# Patient Record
Sex: FEMALE | Race: WHITE | NOT HISPANIC OR LATINO | Employment: OTHER | URBAN - METROPOLITAN AREA
[De-identification: names, ages, dates, MRNs, and addresses within clinical notes are randomized per-mention and may not be internally consistent; named-entity substitution may affect disease eponyms.]

---

## 2020-01-27 ENCOUNTER — OFFICE VISIT (OUTPATIENT)
Dept: FAMILY MEDICINE | Facility: CLINIC | Age: 55
End: 2020-01-27
Payer: COMMERCIAL

## 2020-01-27 VITALS
TEMPERATURE: 97.8 F | WEIGHT: 196 LBS | SYSTOLIC BLOOD PRESSURE: 80 MMHG | HEIGHT: 64 IN | HEART RATE: 68 BPM | RESPIRATION RATE: 20 BRPM | DIASTOLIC BLOOD PRESSURE: 58 MMHG | BODY MASS INDEX: 33.46 KG/M2

## 2020-01-27 DIAGNOSIS — Z13.1 SCREENING FOR DIABETES MELLITUS: ICD-10-CM

## 2020-01-27 DIAGNOSIS — F32.A ANXIETY AND DEPRESSION: ICD-10-CM

## 2020-01-27 DIAGNOSIS — Z23 NEED FOR TDAP VACCINATION: ICD-10-CM

## 2020-01-27 DIAGNOSIS — F41.9 ANXIETY AND DEPRESSION: ICD-10-CM

## 2020-01-27 DIAGNOSIS — E03.9 HYPOTHYROIDISM, UNSPECIFIED TYPE: ICD-10-CM

## 2020-01-27 DIAGNOSIS — Z12.11 SPECIAL SCREENING FOR MALIGNANT NEOPLASMS, COLON: ICD-10-CM

## 2020-01-27 DIAGNOSIS — Z12.31 ENCOUNTER FOR SCREENING MAMMOGRAM FOR BREAST CANCER: Primary | ICD-10-CM

## 2020-01-27 DIAGNOSIS — Z13.220 SCREENING FOR LIPID DISORDERS: ICD-10-CM

## 2020-01-27 PROBLEM — K21.9 GERD (GASTROESOPHAGEAL REFLUX DISEASE): Status: ACTIVE | Noted: 2020-01-27

## 2020-01-27 PROBLEM — F41.0 PANIC DISORDER: Status: ACTIVE | Noted: 2019-01-04

## 2020-01-27 PROBLEM — E03.4 HYPOTHYROIDISM DUE TO ACQUIRED ATROPHY OF THYROID: Status: ACTIVE | Noted: 2017-10-27

## 2020-01-27 LAB
ALBUMIN SERPL-MCNC: 3.7 G/DL (ref 3.4–5)
ALP SERPL-CCNC: 63 U/L (ref 40–150)
ALT SERPL W P-5'-P-CCNC: 34 U/L (ref 0–50)
ANION GAP SERPL CALCULATED.3IONS-SCNC: 6 MMOL/L (ref 3–14)
AST SERPL W P-5'-P-CCNC: 24 U/L (ref 0–45)
BASOPHILS # BLD AUTO: 0.1 10E9/L (ref 0–0.2)
BASOPHILS NFR BLD AUTO: 0.5 %
BILIRUB SERPL-MCNC: 0.4 MG/DL (ref 0.2–1.3)
BUN SERPL-MCNC: 14 MG/DL (ref 7–30)
CALCIUM SERPL-MCNC: 8.8 MG/DL (ref 8.5–10.1)
CHLORIDE SERPL-SCNC: 109 MMOL/L (ref 94–109)
CHOLEST SERPL-MCNC: 175 MG/DL
CO2 SERPL-SCNC: 24 MMOL/L (ref 20–32)
CREAT SERPL-MCNC: 0.76 MG/DL (ref 0.52–1.04)
DIFFERENTIAL METHOD BLD: NORMAL
EOSINOPHIL # BLD AUTO: 0.1 10E9/L (ref 0–0.7)
EOSINOPHIL NFR BLD AUTO: 1 %
ERYTHROCYTE [DISTWIDTH] IN BLOOD BY AUTOMATED COUNT: 12.1 % (ref 10–15)
GFR SERPL CREATININE-BSD FRML MDRD: 87 ML/MIN/{1.73_M2}
GLUCOSE SERPL-MCNC: 85 MG/DL (ref 70–99)
HCT VFR BLD AUTO: 46.8 % (ref 35–47)
HDLC SERPL-MCNC: 50 MG/DL
HGB BLD-MCNC: 15.7 G/DL (ref 11.7–15.7)
LDLC SERPL CALC-MCNC: 85 MG/DL
LYMPHOCYTES # BLD AUTO: 3 10E9/L (ref 0.8–5.3)
LYMPHOCYTES NFR BLD AUTO: 32.6 %
MCH RBC QN AUTO: 31.5 PG (ref 26.5–33)
MCHC RBC AUTO-ENTMCNC: 33.5 G/DL (ref 31.5–36.5)
MCV RBC AUTO: 94 FL (ref 78–100)
MONOCYTES # BLD AUTO: 0.7 10E9/L (ref 0–1.3)
MONOCYTES NFR BLD AUTO: 7.6 %
NEUTROPHILS # BLD AUTO: 5.3 10E9/L (ref 1.6–8.3)
NEUTROPHILS NFR BLD AUTO: 58.3 %
NONHDLC SERPL-MCNC: 125 MG/DL
PLATELET # BLD AUTO: 267 10E9/L (ref 150–450)
POTASSIUM SERPL-SCNC: 3.9 MMOL/L (ref 3.4–5.3)
PROT SERPL-MCNC: 7.3 G/DL (ref 6.8–8.8)
RBC # BLD AUTO: 4.99 10E12/L (ref 3.8–5.2)
SODIUM SERPL-SCNC: 139 MMOL/L (ref 133–144)
TRIGL SERPL-MCNC: 198 MG/DL
TSH SERPL DL<=0.005 MIU/L-ACNC: 3.22 MU/L (ref 0.4–4)
WBC # BLD AUTO: 9.2 10E9/L (ref 4–11)

## 2020-01-27 PROCEDURE — 90715 TDAP VACCINE 7 YRS/> IM: CPT | Performed by: NURSE PRACTITIONER

## 2020-01-27 PROCEDURE — 80061 LIPID PANEL: CPT | Performed by: NURSE PRACTITIONER

## 2020-01-27 PROCEDURE — 36415 COLL VENOUS BLD VENIPUNCTURE: CPT | Performed by: NURSE PRACTITIONER

## 2020-01-27 PROCEDURE — 99204 OFFICE O/P NEW MOD 45 MIN: CPT | Performed by: NURSE PRACTITIONER

## 2020-01-27 PROCEDURE — 90471 IMMUNIZATION ADMIN: CPT | Performed by: NURSE PRACTITIONER

## 2020-01-27 PROCEDURE — 80050 GENERAL HEALTH PANEL: CPT | Performed by: NURSE PRACTITIONER

## 2020-01-27 RX ORDER — CHOLECALCIFEROL (VITAMIN D3) 25 MCG
1 CAPSULE ORAL
COMMUNITY

## 2020-01-27 RX ORDER — LEVOTHYROXINE SODIUM 25 UG/1
25 TABLET ORAL DAILY
Qty: 90 TABLET | Refills: 3 | Status: SHIPPED | OUTPATIENT
Start: 2020-01-27 | End: 2020-11-21

## 2020-01-27 RX ORDER — ASPIRIN 81 MG/1
1 TABLET, CHEWABLE ORAL
COMMUNITY
End: 2022-12-14

## 2020-01-27 RX ORDER — LEVOTHYROXINE SODIUM 25 UG/1
25 TABLET ORAL DAILY
COMMUNITY
End: 2020-01-27

## 2020-01-27 RX ORDER — CHLORAL HYDRATE 500 MG
1 CAPSULE ORAL
COMMUNITY
End: 2020-01-27

## 2020-01-27 RX ORDER — CITALOPRAM HYDROBROMIDE 20 MG/1
20 TABLET ORAL DAILY
Qty: 90 TABLET | Refills: 3 | Status: SHIPPED | OUTPATIENT
Start: 2020-01-27 | End: 2021-01-18

## 2020-01-27 RX ORDER — IBUPROFEN 200 MG
1 CAPSULE ORAL
COMMUNITY

## 2020-01-27 RX ORDER — CITALOPRAM HYDROBROMIDE 20 MG/1
20 TABLET ORAL DAILY
COMMUNITY
Start: 2019-10-30 | End: 2020-01-27

## 2020-01-27 ASSESSMENT — ANXIETY QUESTIONNAIRES
6. BECOMING EASILY ANNOYED OR IRRITABLE: NOT AT ALL
GAD7 TOTAL SCORE: 4
1. FEELING NERVOUS, ANXIOUS, OR ON EDGE: SEVERAL DAYS
5. BEING SO RESTLESS THAT IT IS HARD TO SIT STILL: NOT AT ALL
7. FEELING AFRAID AS IF SOMETHING AWFUL MIGHT HAPPEN: SEVERAL DAYS
3. WORRYING TOO MUCH ABOUT DIFFERENT THINGS: SEVERAL DAYS
2. NOT BEING ABLE TO STOP OR CONTROL WORRYING: SEVERAL DAYS

## 2020-01-27 ASSESSMENT — PAIN SCALES - GENERAL: PAINLEVEL: NO PAIN (0)

## 2020-01-27 ASSESSMENT — ENCOUNTER SYMPTOMS
FATIGUE: 0
RHINORRHEA: 0
DYSPHORIC MOOD: 0
DIZZINESS: 0
PALPITATIONS: 0
ABDOMINAL DISTENTION: 0
VOMITING: 0
NERVOUS/ANXIOUS: 0
SORE THROAT: 0
CONSTIPATION: 0
MYALGIAS: 0
DIARRHEA: 0
ABDOMINAL PAIN: 0
COUGH: 0
SLEEP DISTURBANCE: 0
CHEST TIGHTNESS: 0
HEADACHES: 0
NUMBNESS: 0
SHORTNESS OF BREATH: 0
NAUSEA: 0
ARTHRALGIAS: 0
WHEEZING: 0
LIGHT-HEADEDNESS: 0

## 2020-01-27 ASSESSMENT — PATIENT HEALTH QUESTIONNAIRE - PHQ9
5. POOR APPETITE OR OVEREATING: NOT AT ALL
SUM OF ALL RESPONSES TO PHQ QUESTIONS 1-9: 0

## 2020-01-27 ASSESSMENT — MIFFLIN-ST. JEOR: SCORE: 1468.02

## 2020-01-27 NOTE — PROGRESS NOTES
Subjective     Melia Baires is a 54 year old female who presents to clinic today for the following health issues:    HPI     Chief Complaint   Patient presents with     Recheck Medication     pt is not fasting       Depression Followup    How are you doing with your depression since your last visit? No change    Are you having other symptoms that might be associated with depression? No    Have you had a significant life event?  OTHER: mom in nursing home     Are you feeling anxious or having panic attacks?   Yes:  heart races    Do you have any concerns with your use of alcohol or other drugs? No    Social History     Tobacco Use     Smoking status: Former Smoker     Smokeless tobacco: Never Used   Substance Use Topics     Alcohol use: Yes     Comment: rare     Drug use: Never       Suicide Assessment Five-step Evaluation and Treatment (SAFE-T)    Hypothyroidism Follow-up      Since last visit, patient describes the following symptoms: Weight stable, no hair loss, no skin changes, no constipation, no loose stools      How many servings of fruits and vegetables do you eat daily?  2-3    On average, how many sweetened beverages do you drink each day (Examples: soda, juice, sweet tea, etc.  Do NOT count diet or artificially sweetened beverages)?   0    How many days per week do you exercise enough to make your heart beat faster? 3 or less    How many minutes a day do you exercise enough to make your heart beat faster? 9 or less    How many days per week do you miss taking your medication? 0    Current Outpatient Medications   Medication Sig Dispense Refill     citalopram (CELEXA) 20 MG tablet Take 1 tablet (20 mg) by mouth daily 90 tablet 3     Flaxseed, Linseed, (FLAX SEED OIL PO) Take 1,200 mg by mouth       Glucosamine HCl (GLUCOSAMINE PO) Take 1,000 mg by mouth       levothyroxine (SYNTHROID/LEVOTHROID) 25 MCG tablet Take 1 tablet (25 mcg) by mouth daily 90 tablet 3     Multiple Vitamins-Minerals (MULTIVITAMIN PO)  "       aspirin (ASA) 81 MG chewable tablet Take 1 tablet by mouth       calcium 600 MG tablet 1 tablet       Cholecalciferol (VITAMIN D-3) 25 MCG (1000 UT) CAPS 1 tablet       Allergies   Allergen Reactions     Oxycodone GI Disturbance     Severe nausea and vomiting        Reviewed and updated as needed this visit by Provider          Here today to establish care.  Needs to get set up for mammogram.  Does self breast exams without area of concern.    Has been on citalopram for some time. Does feel like it is working. Has always been on this med and dose. Mother and brother are also on this med.  Takes it for anxiety.  Used to heat to getting up to go to work and medication has worked very well.    Has been on same dose of levothyroxine for some time.  Has not missed any doses.    Last Pap: Hyst at age 37. Due to endometriosis. Took ovaries. Did have abnormal pap but repeat was normal.   Last mammogram: 2017. Has dense breast. No family history of breast cancer.   Last BMD: Never   Last Colonoscopy: 2015, 2-3 cm polyps.    Last eye exam: every 2 years  Last dental exam: Every 12 mo  Last tetanus vaccine: 2010  Last influenza vaccine: 9/19  Last shingles vaccine: Plans to do  Last pneumonia vaccine: N/A   Hep C screen (born [id number]): 12/[birth year]  HIV screen: 12/16  AAA screen (age 65-78 with smoking hx): N/A  IVD (HTN, Hyperlipid, Smoking): N/A  Lung CA screening (55-80, 30 pk smoking hx): Former smoker. Quit 15 years ago. Was smoking 1  pk per week. Smoked for about 15-20 years       Objective    BP (!) 80/58 (BP Location: Right arm, Patient Position: Sitting, Cuff Size: Adult Large)   Pulse 68   Temp 97.8  F (36.6  C) (Tympanic)   Resp 20   Ht 1.632 m (5' 4.25\")   Wt 88.9 kg (196 lb)   BMI 33.38 kg/m    Body mass index is 33.38 kg/m .            Assessment & Plan     Review of Systems   Constitutional: Negative for fatigue.   HENT: Negative for ear pain, rhinorrhea and sore throat.    Eyes: Negative for visual " disturbance.   Respiratory: Negative for cough, chest tightness, shortness of breath and wheezing.    Cardiovascular: Negative for chest pain, palpitations and leg swelling.   Gastrointestinal: Negative for abdominal distention, abdominal pain, constipation, diarrhea, nausea and vomiting.   Endocrine: Negative for cold intolerance and heat intolerance.   Musculoskeletal: Negative for arthralgias and myalgias.   Skin: Negative for rash.   Neurological: Negative for dizziness, light-headedness, numbness and headaches.   Psychiatric/Behavioral: Negative for dysphoric mood and sleep disturbance. The patient is not nervous/anxious.        Physical Exam  Constitutional:       Appearance: Normal appearance. She is well-developed.   HENT:      Head: Normocephalic and atraumatic.      Right Ear: Tympanic membrane and external ear normal. No middle ear effusion.      Left Ear: Tympanic membrane and external ear normal.  No middle ear effusion.      Nose: No mucosal edema.   Neck:      Thyroid: No thyromegaly.      Vascular: No carotid bruit.   Cardiovascular:      Rate and Rhythm: Normal rate and regular rhythm.      Heart sounds: Normal heart sounds.   Pulmonary:      Effort: Pulmonary effort is normal.      Breath sounds: Normal breath sounds.   Abdominal:      General: Bowel sounds are normal.      Palpations: Abdomen is soft.   Skin:     General: Skin is warm and dry.   Neurological:      Mental Status: She is alert.   Psychiatric:         Behavior: Behavior normal.       1. Encounter for screening mammogram for breast cancer  - GASTROENTEROLOGY ADULT REF PROCEDURE ONLY    2. Special screening for malignant neoplasms, colon  Order placed, plans to call per self and set up  - MA Screening Digital Bilateral; Future    3. Anxiety and depression  Doing well on current.   Will plan to continue current.   Follow up in 1 year or sooner if needed  - citalopram (CELEXA) 20 MG tablet; Take 1 tablet (20 mg) by mouth daily  Dispense:  90 tablet; Refill: 3    4. Hypothyroidism, unspecified type  We will check labs here today.  Plan to follow-up in 1 year or sooner if needed.  - CBC with platelets differential  - TSH with free T4 reflex  - levothyroxine (SYNTHROID/LEVOTHROID) 25 MCG tablet; Take 1 tablet (25 mcg) by mouth daily  Dispense: 90 tablet; Refill: 3    5. Screening for lipid disorders  We will check fasting labs here today  - Lipid panel reflex to direct LDL Fasting    6. Screening for diabetes mellitus  We will check fasting labs here today  - Comprehensive metabolic panel       Return in about 1 year (around 1/27/2021) for A Routine Visit.    MEME Geronimo [facility]

## 2020-01-28 ASSESSMENT — ANXIETY QUESTIONNAIRES: GAD7 TOTAL SCORE: 4

## 2020-02-06 ENCOUNTER — ANCILLARY PROCEDURE (OUTPATIENT)
Dept: MAMMOGRAPHY | Facility: CLINIC | Age: 55
End: 2020-02-06
Payer: COMMERCIAL

## 2020-02-06 DIAGNOSIS — Z12.11 SPECIAL SCREENING FOR MALIGNANT NEOPLASMS, COLON: ICD-10-CM

## 2020-02-06 PROCEDURE — 77063 BREAST TOMOSYNTHESIS BI: CPT | Mod: TC

## 2020-02-06 PROCEDURE — 77067 SCR MAMMO BI INCL CAD: CPT | Mod: TC

## 2020-03-05 ENCOUNTER — ANESTHESIA EVENT (OUTPATIENT)
Dept: GASTROENTEROLOGY | Facility: CLINIC | Age: 55
End: 2020-03-05
Payer: COMMERCIAL

## 2020-03-05 ASSESSMENT — LIFESTYLE VARIABLES: TOBACCO_USE: 1

## 2020-03-05 NOTE — ANESTHESIA PREPROCEDURE EVALUATION
Anesthesia Pre-Procedure Evaluation    Patient: Melia Baires   MRN: [id number] : 1965          Preoperative Diagnosis: Special screening for malignant neoplasms, colon [Z12.11]    Procedure(s):  COLONOSCOPY    No past medical history on file.  Past Surgical History:   Procedure Laterality Date     HERNIA REPAIR       HYSTERECTOMY, PAP NO LONGER INDICATED       LAPAROSCOPY      Pelvic       Anesthesia Evaluation     . Pt has had prior anesthetic. Type: General           ROS/MED HX    ENT/Pulmonary:     (+)tobacco use, Past use , . .    Neurologic:       Cardiovascular:  - neg cardiovascular ROS       METS/Exercise Tolerance:     Hematologic:  - neg hematologic  ROS       Musculoskeletal:  - neg musculoskeletal ROS       GI/Hepatic:     (+) GERD Inflammatory bowel disease,       Renal/Genitourinary:  - ROS Renal section negative       Endo:     (+) thyroid problem hypothyroidism, Obesity, .      Psychiatric:     (+) psychiatric history other (comment) (Panic disorder)      Infectious Disease:  - neg infectious disease ROS       Malignancy:      - no malignancy   Other:    - neg other ROS                      Physical Exam  Normal systems: cardiovascular, pulmonary and dental    Airway   Mallampati: I  TM distance: >3 FB  Neck ROM: full    Dental     Cardiovascular       Pulmonary             Lab Results   Component Value Date    WBC 9.2 2020    HGB 15.7 2020    HCT 46.8 2020     2020     2020    POTASSIUM 3.9 2020    CHLORIDE 109 2020    CO2 24 2020    BUN 14 2020    CR 0.76 2020    GLC 85 2020    SHAQ 8.8 2020    ALBUMIN 3.7 2020    PROTTOTAL 7.3 2020    ALT 34 2020    AST 24 2020    ALKPHOS 63 2020    BILITOTAL 0.4 2020    TSH 3.22 2020       Preop Vitals  BP Readings from Last 3 Encounters:   20 (!) 80/58    Pulse Readings from Last 3 Encounters:   20 68      Resp  "Readings from Last 3 Encounters:   01/27/20 20    SpO2 Readings from Last 3 Encounters:   No data found for SpO2      Temp Readings from Last 1 Encounters:   01/27/20 36.6  C (97.8  F) (Tympanic)    Ht Readings from Last 1 Encounters:   01/27/20 1.632 m (5' 4.25\")      Wt Readings from Last 1 Encounters:   01/27/20 88.9 kg (196 lb)    Estimated body mass index is 33.38 kg/m  as calculated from the following:    Height as of 1/27/20: 1.632 m (5' 4.25\").    Weight as of 1/27/20: 88.9 kg (196 lb).       Anesthesia Plan      History & Physical Review  History and physical reviewed and following examination; no interval change.    ASA Status:  2 .    NPO Status:  > 6 hours    Plan for General and MAC with Propofol induction. Maintenance will be Balanced.  Reason for MAC:  Deep or markedly invasive procedure (G8)  PONV prophylaxis:  Ondansetron (or other 5HT-3) and Dexamethasone or Solumedrol         Postoperative Care  Postoperative pain management:  Multi-modal analgesia.      Consents  Anesthetic plan, risks, benefits and alternatives discussed with:  Patient..                 MEME De Santiago CRNA  "

## 2020-03-10 ENCOUNTER — HOSPITAL ENCOUNTER (OUTPATIENT)
Facility: CLINIC | Age: 55
Discharge: HOME | End: 2020-03-10
Attending: SURGERY | Admitting: SURGERY
Payer: COMMERCIAL

## 2020-03-10 ENCOUNTER — ANESTHESIA (OUTPATIENT)
Dept: GASTROENTEROLOGY | Facility: CLINIC | Age: 55
End: 2020-03-10
Payer: COMMERCIAL

## 2020-03-10 VITALS
SYSTOLIC BLOOD PRESSURE: 121 MMHG | RESPIRATION RATE: 16 BRPM | HEIGHT: 64 IN | BODY MASS INDEX: 33.46 KG/M2 | DIASTOLIC BLOOD PRESSURE: 83 MMHG | WEIGHT: 196 LBS | HEART RATE: 74 BPM | OXYGEN SATURATION: 98 % | TEMPERATURE: 97.8 F

## 2020-03-10 LAB — COLONOSCOPY: NORMAL

## 2020-03-10 PROCEDURE — 25000128 H RX IP 250 OP 636: Performed by: NURSE ANESTHETIST, CERTIFIED REGISTERED

## 2020-03-10 PROCEDURE — 25800030 ZZH RX IP 258 OP 636: Performed by: SURGERY

## 2020-03-10 PROCEDURE — G0121 COLON CA SCRN NOT HI RSK IND: HCPCS | Performed by: SURGERY

## 2020-03-10 PROCEDURE — 45378 DIAGNOSTIC COLONOSCOPY: CPT | Performed by: SURGERY

## 2020-03-10 PROCEDURE — 25000125 ZZHC RX 250: Performed by: NURSE ANESTHETIST, CERTIFIED REGISTERED

## 2020-03-10 PROCEDURE — 37000008 ZZH ANESTHESIA TECHNICAL FEE, 1ST 30 MIN: Performed by: SURGERY

## 2020-03-10 PROCEDURE — 25000125 ZZHC RX 250: Performed by: SURGERY

## 2020-03-10 RX ORDER — PROPOFOL 10 MG/ML
INJECTION, EMULSION INTRAVENOUS CONTINUOUS PRN
Status: DISCONTINUED | OUTPATIENT
Start: 2020-03-10 | End: 2020-03-10

## 2020-03-10 RX ORDER — GLYCOPYRROLATE 0.2 MG/ML
INJECTION, SOLUTION INTRAMUSCULAR; INTRAVENOUS PRN
Status: DISCONTINUED | OUTPATIENT
Start: 2020-03-10 | End: 2020-03-10

## 2020-03-10 RX ORDER — PROPOFOL 10 MG/ML
INJECTION, EMULSION INTRAVENOUS PRN
Status: DISCONTINUED | OUTPATIENT
Start: 2020-03-10 | End: 2020-03-10

## 2020-03-10 RX ORDER — SODIUM CHLORIDE, SODIUM LACTATE, POTASSIUM CHLORIDE, CALCIUM CHLORIDE 600; 310; 30; 20 MG/100ML; MG/100ML; MG/100ML; MG/100ML
INJECTION, SOLUTION INTRAVENOUS CONTINUOUS
Status: DISCONTINUED | OUTPATIENT
Start: 2020-03-10 | End: 2020-03-10 | Stop reason: HOSPADM

## 2020-03-10 RX ORDER — ONDANSETRON 2 MG/ML
4 INJECTION INTRAMUSCULAR; INTRAVENOUS
Status: DISCONTINUED | OUTPATIENT
Start: 2020-03-10 | End: 2020-03-10 | Stop reason: HOSPADM

## 2020-03-10 RX ORDER — LIDOCAINE HYDROCHLORIDE 10 MG/ML
INJECTION, SOLUTION INFILTRATION; PERINEURAL PRN
Status: DISCONTINUED | OUTPATIENT
Start: 2020-03-10 | End: 2020-03-10

## 2020-03-10 RX ORDER — LIDOCAINE 40 MG/G
CREAM TOPICAL
Status: DISCONTINUED | OUTPATIENT
Start: 2020-03-10 | End: 2020-03-10 | Stop reason: HOSPADM

## 2020-03-10 RX ADMIN — PROPOFOL 100 MG: 10 INJECTION, EMULSION INTRAVENOUS at 07:35

## 2020-03-10 RX ADMIN — GLYCOPYRROLATE 0.1 MG: 0.2 INJECTION, SOLUTION INTRAMUSCULAR; INTRAVENOUS at 07:35

## 2020-03-10 RX ADMIN — PROPOFOL 200 MCG/KG/MIN: 10 INJECTION, EMULSION INTRAVENOUS at 07:35

## 2020-03-10 RX ADMIN — SODIUM CHLORIDE, POTASSIUM CHLORIDE, SODIUM LACTATE AND CALCIUM CHLORIDE: 600; 310; 30; 20 INJECTION, SOLUTION INTRAVENOUS at 06:57

## 2020-03-10 RX ADMIN — LIDOCAINE HYDROCHLORIDE 50 MG: 10 INJECTION, SOLUTION INFILTRATION; PERINEURAL at 07:35

## 2020-03-10 RX ADMIN — LIDOCAINE HYDROCHLORIDE 1 ML: 10 INJECTION, SOLUTION EPIDURAL; INFILTRATION; INTRACAUDAL; PERINEURAL at 06:57

## 2020-03-10 ASSESSMENT — MIFFLIN-ST. JEOR
SCORE: 1464.05
SCORE: 1464.05

## 2020-03-10 NOTE — ANESTHESIA CARE TRANSFER NOTE
Patient: Melia Baires    Procedure(s):  COLONOSCOPY    Diagnosis: Special screening for malignant neoplasms, colon [Z12.11]  Diagnosis Additional Information: No value filed.    Anesthesia Type:   General, MAC     Note:  Airway :Nasal Cannula  Patient transferred to:Phase II  Handoff Report: Identifed the Patient, Identified the Reponsible Provider, Reviewed the pertinent medical history, Discussed the surgical course, Reviewed Intra-OP anesthesia mangement and issues during anesthesia, Set expectations for post-procedure period and Allowed opportunity for questions and acknowledgement of understanding      Vitals: (Last set prior to Anesthesia Care Transfer)    CRNA VITALS  3/10/2020 0720 - 3/10/2020 0752      3/10/2020             Pulse:  77    Ht Rate:  75    SpO2:  95 %                Electronically Signed By: MEME De Santiago CRNA  March 10, 2020  7:52 AM

## 2020-03-10 NOTE — BRIEF OP NOTE
[facility]   Brief Operative Note    Pre-operative diagnosis: Special screening for malignant neoplasms, colon [Z12.11]   Post-operative diagnosis diverticulosis, hemorrhoids     Procedure: Procedure(s):  COLONOSCOPY   Surgeon(s): Surgeon(s) and Role:     * Melvin Liu MD - Primary   Estimated blood loss: * No values recorded between 3/10/2020  7:39 AM and 3/10/2020  7:52 AM *    Specimens: * No specimens in log *   Findings: 1. Mild sigmoid diverticulosis  2. Hemorrhoids  3. Colon otherwise normal

## 2020-03-10 NOTE — H&P
"54 year old year old female here for colonoscopy for screening.    Patient Active Problem List   Diagnosis     Colon polyps     GERD (gastroesophageal reflux disease)     Hypothyroidism due to acquired atrophy of thyroid     IBS (irritable bowel syndrome)     Panic disorder     Spigelian hernia       History reviewed. No pertinent past medical history.    Past Surgical History:   Procedure Laterality Date     HERNIA REPAIR       HYSTERECTOMY, PAP NO LONGER INDICATED  2002     LAPAROSCOPY      Pelvic       [unfilled]    No current outpatient medications on file.       Allergies   Allergen Reactions     Oxycodone GI Disturbance     Severe nausea and vomiting        Pt reports that she has quit smoking. She has never used smokeless tobacco. She reports current alcohol use. She reports that she does not use drugs.    Exam:  /78 (BP Location: Right arm)   Pulse 83   Temp 97.8  F (36.6  C) (Oral)   Resp 16   Ht 1.626 m (5' 4\")   Wt 88.9 kg (196 lb)   SpO2 95%   Breastfeeding No   BMI 33.64 kg/m      Awake, Alert OX3  Lungs - CTA bilaterally  CV - RRR, no murmurs, distal pulses intact  Abd - soft, non-distended, non-tender, +BS  Extr - No cyanosis or edema    A/P 54 year old year old female in need of colonoscopy for screening. Risks, benefits, alternatives, and complications were discussed including the possibility of perforation and the patient agreed to proceed    Melvin Liu MD   "

## 2020-03-10 NOTE — ANESTHESIA POSTPROCEDURE EVALUATION
Patient: Melia Baires    Procedure(s):  COLONOSCOPY    Diagnosis:Special screening for malignant neoplasms, colon [Z12.11]  Diagnosis Additional Information: No value filed.    Anesthesia Type:  General, MAC    Note:  Anesthesia Post Evaluation    Patient location during evaluation: Phase 2 and Bedside  Patient participation: Able to fully participate in evaluation  Level of consciousness: awake and alert  Pain management: adequate  Airway patency: patent  Cardiovascular status: acceptable and hemodynamically stable  Respiratory status: acceptable and room air  Hydration status: acceptable  PONV: none     Anesthetic complications: None          Last vitals:  Vitals:    03/10/20 0628 03/10/20 0754   BP: 122/78 (!) 105/93   Pulse: 83 79   Resp: 16 16   Temp: 36.6  C (97.8  F)    SpO2: 95% 95%         Electronically Signed By: MEME De Santiago CRNA  March 10, 2020  8:02 AM

## 2020-03-15 ENCOUNTER — HEALTH MAINTENANCE LETTER (OUTPATIENT)
Age: 55
End: 2020-03-15

## 2020-11-21 ENCOUNTER — MYC REFILL (OUTPATIENT)
Dept: FAMILY MEDICINE | Facility: CLINIC | Age: 55
End: 2020-11-21

## 2020-11-21 DIAGNOSIS — E03.9 HYPOTHYROIDISM, UNSPECIFIED TYPE: ICD-10-CM

## 2020-11-22 RX ORDER — LEVOTHYROXINE SODIUM 25 UG/1
25 TABLET ORAL DAILY
Qty: 90 TABLET | Refills: 0 | Status: SHIPPED | OUTPATIENT
Start: 2020-11-22 | End: 2020-11-23

## 2020-11-23 DIAGNOSIS — E03.9 HYPOTHYROIDISM, UNSPECIFIED TYPE: ICD-10-CM

## 2020-11-23 RX ORDER — LEVOTHYROXINE SODIUM 25 UG/1
25 TABLET ORAL DAILY
Qty: 90 TABLET | Refills: 0 | Status: SHIPPED | OUTPATIENT
Start: 2020-11-23 | End: 2021-01-18

## 2021-01-18 ENCOUNTER — OFFICE VISIT (OUTPATIENT)
Dept: FAMILY MEDICINE | Facility: CLINIC | Age: 56
End: 2021-01-18
Payer: COMMERCIAL

## 2021-01-18 VITALS
TEMPERATURE: 97.9 F | SYSTOLIC BLOOD PRESSURE: 100 MMHG | RESPIRATION RATE: 16 BRPM | WEIGHT: 209 LBS | HEART RATE: 70 BPM | HEIGHT: 64 IN | DIASTOLIC BLOOD PRESSURE: 74 MMHG | BODY MASS INDEX: 35.68 KG/M2

## 2021-01-18 DIAGNOSIS — E66.01 MORBID OBESITY (H): ICD-10-CM

## 2021-01-18 DIAGNOSIS — F41.9 ANXIETY AND DEPRESSION: ICD-10-CM

## 2021-01-18 DIAGNOSIS — R09.A2 GLOBUS SENSATION: Primary | ICD-10-CM

## 2021-01-18 DIAGNOSIS — K58.2 IRRITABLE BOWEL SYNDROME WITH BOTH CONSTIPATION AND DIARRHEA: ICD-10-CM

## 2021-01-18 DIAGNOSIS — F32.A ANXIETY AND DEPRESSION: ICD-10-CM

## 2021-01-18 DIAGNOSIS — E03.9 HYPOTHYROIDISM, UNSPECIFIED TYPE: ICD-10-CM

## 2021-01-18 DIAGNOSIS — Z13.220 SCREENING FOR LIPOID DISORDERS: ICD-10-CM

## 2021-01-18 PROBLEM — E03.4 HYPOTHYROIDISM DUE TO ACQUIRED ATROPHY OF THYROID: Status: RESOLVED | Noted: 2017-10-27 | Resolved: 2021-01-18

## 2021-01-18 LAB
ANION GAP SERPL CALCULATED.3IONS-SCNC: 4 MMOL/L (ref 3–14)
BUN SERPL-MCNC: 16 MG/DL (ref 7–30)
CALCIUM SERPL-MCNC: 8.9 MG/DL (ref 8.5–10.1)
CHLORIDE SERPL-SCNC: 107 MMOL/L (ref 94–109)
CHOLEST SERPL-MCNC: 219 MG/DL
CO2 SERPL-SCNC: 27 MMOL/L (ref 20–32)
CREAT SERPL-MCNC: 0.84 MG/DL (ref 0.52–1.04)
GFR SERPL CREATININE-BSD FRML MDRD: 77 ML/MIN/{1.73_M2}
GLUCOSE SERPL-MCNC: 91 MG/DL (ref 70–99)
HDLC SERPL-MCNC: 49 MG/DL
LDLC SERPL CALC-MCNC: 124 MG/DL
NONHDLC SERPL-MCNC: 170 MG/DL
POTASSIUM SERPL-SCNC: 4.3 MMOL/L (ref 3.4–5.3)
SODIUM SERPL-SCNC: 138 MMOL/L (ref 133–144)
T4 FREE SERPL-MCNC: 0.78 NG/DL (ref 0.76–1.46)
TRIGL SERPL-MCNC: 230 MG/DL
TSH SERPL DL<=0.005 MIU/L-ACNC: 6.03 MU/L (ref 0.4–4)

## 2021-01-18 PROCEDURE — 36415 COLL VENOUS BLD VENIPUNCTURE: CPT | Performed by: NURSE PRACTITIONER

## 2021-01-18 PROCEDURE — 80048 BASIC METABOLIC PNL TOTAL CA: CPT | Performed by: NURSE PRACTITIONER

## 2021-01-18 PROCEDURE — 99214 OFFICE O/P EST MOD 30 MIN: CPT | Performed by: NURSE PRACTITIONER

## 2021-01-18 PROCEDURE — 84443 ASSAY THYROID STIM HORMONE: CPT | Performed by: NURSE PRACTITIONER

## 2021-01-18 PROCEDURE — 84439 ASSAY OF FREE THYROXINE: CPT | Performed by: NURSE PRACTITIONER

## 2021-01-18 PROCEDURE — 80061 LIPID PANEL: CPT | Performed by: NURSE PRACTITIONER

## 2021-01-18 RX ORDER — LEVOTHYROXINE SODIUM 25 UG/1
25 TABLET ORAL DAILY
Qty: 90 TABLET | Refills: 3 | Status: SHIPPED | OUTPATIENT
Start: 2021-01-18 | End: 2021-12-20

## 2021-01-18 RX ORDER — CITALOPRAM HYDROBROMIDE 20 MG/1
20 TABLET ORAL DAILY
Qty: 90 TABLET | Refills: 3 | Status: SHIPPED | OUTPATIENT
Start: 2021-01-18 | End: 2021-12-20

## 2021-01-18 ASSESSMENT — ENCOUNTER SYMPTOMS
DIZZINESS: 0
MYALGIAS: 0
COUGH: 0
CONSTIPATION: 0
PALPITATIONS: 0
DYSPHORIC MOOD: 0
RHINORRHEA: 0
NAUSEA: 0
VOMITING: 0
NERVOUS/ANXIOUS: 0
WHEEZING: 0
ABDOMINAL PAIN: 0
HEADACHES: 0
NUMBNESS: 0
DIARRHEA: 0
SORE THROAT: 0
SLEEP DISTURBANCE: 0
SHORTNESS OF BREATH: 0
ABDOMINAL DISTENTION: 0
CHEST TIGHTNESS: 0
FATIGUE: 0
ARTHRALGIAS: 0
LIGHT-HEADEDNESS: 0

## 2021-01-18 ASSESSMENT — ANXIETY QUESTIONNAIRES
GAD7 TOTAL SCORE: 0
3. WORRYING TOO MUCH ABOUT DIFFERENT THINGS: NOT AT ALL
2. NOT BEING ABLE TO STOP OR CONTROL WORRYING: NOT AT ALL
1. FEELING NERVOUS, ANXIOUS, OR ON EDGE: NOT AT ALL
5. BEING SO RESTLESS THAT IT IS HARD TO SIT STILL: NOT AT ALL
6. BECOMING EASILY ANNOYED OR IRRITABLE: NOT AT ALL
7. FEELING AFRAID AS IF SOMETHING AWFUL MIGHT HAPPEN: NOT AT ALL

## 2021-01-18 ASSESSMENT — PATIENT HEALTH QUESTIONNAIRE - PHQ9
5. POOR APPETITE OR OVEREATING: NOT AT ALL
SUM OF ALL RESPONSES TO PHQ QUESTIONS 1-9: 0

## 2021-01-18 ASSESSMENT — MIFFLIN-ST. JEOR: SCORE: 1518.02

## 2021-01-18 NOTE — PROGRESS NOTES
"  Assessment & Plan     Anxiety and depression  Stable-doing well on current dose of citalopram.  Refills given.  Plan to follow-up in clinic in 1 year or sooner if needed  - citalopram (CELEXA) 20 MG tablet; Take 1 tablet (20 mg) by mouth daily    Morbid obesity (H)  Encouraged to remain active.    Hypothyroidism, unspecified type  We will recheck TSH here today.  Refill sent.  Plan to follow-up in 1 year or sooner if needed.  - Basic metabolic panel  - TSH with free T4 reflex  - levothyroxine (SYNTHROID/LEVOTHROID) 25 MCG tablet; Take 1 tablet (25 mcg) by mouth daily    Globus sensation  Order placed  - GASTROENTEROLOGY ADULT REF PROCEDURE ONLY; Future    Screening for lipoid disorders  We will check here today  - Lipid panel reflex to direct LDL Fasting    Irritable bowel syndrome with both constipation and diarrhea  Continue Metamucil daily.  To notify if ineffective and will send prescription for Bentyl.      35 minutes spent on the date of the encounter doing chart review, history and exam, documentation and further activities as noted above       BMI:   Estimated body mass index is 35.87 kg/m  as calculated from the following:    Height as of this encounter: 1.626 m (5' 4\").    Weight as of this encounter: 94.8 kg (209 lb).   Weight management plan: Discussed healthy diet and exercise guidelines      Return in about 1 year (around 1/18/2022) for A Routine Visit.    MEME Geronimo CNP  [facility] RAMSES Sandoval is a 55 year old who presents to clinic today for the following health issues     HPI       Depression and Anxiety Follow-Up    How are you doing with your depression since your last visit? No change    How are you doing with your anxiety since your last visit?  No change    Are you having other symptoms that might be associated with depression or anxiety? No    Have you had a significant life event? No     Do you have any concerns with your use of alcohol or other " drugs? No    Social History     Tobacco Use     Smoking status: Former Smoker     Smokeless tobacco: Never Used   Substance Use Topics     Alcohol use: Yes     Comment: rare     Drug use: Never     PHQ 1/27/2020   PHQ-9 Total Score 0   Q9: Thoughts of better off dead/self-harm past 2 weeks Not at all     MARISSA-7 SCORE 1/27/2020   Total Score 4         Suicide Assessment Five-step Evaluation and Treatment (SAFE-T)    Hypothyroidism Follow-up      Since last visit, patient describes the following symptoms: Weight stable, no hair loss, no skin changes, no constipation, no loose stools      At appointment today with .  Is fasting today for labs.  Needs to have refill of medications.  Has been taking citalopram and feels like it is working well.  No side effects that is aware of.  Does not forget to take it.    Has noticed that things get stuck when eating.  Specifically, more with meat.  Did have one time recently that vomited when was eating because something got stuck.  Has had previous upper endoscopy and had to have esophagus stretched.  Reports multiple family members have this issue as well.    Has been trying to be active.  Likes to walk.  Did buy a treadmill in October but so far, has been walking mostly outside.    Will get colon spasms. Has been on meds in the past for this. Would take 1 tab as needed. Rare diarrhea. Started metamucil and that does seem to be helping.  [facility] reveals previous prescription for dicyclomine/Bentyl 10 mg.    Had influenza vaccine in October 2020.    Has had hysterectomy in the past for noncancerous reasons, endometriosis.  Took ovaries.  Has had abnormal Pap in the past but repeat was normal.    Review of Systems   Constitutional: Negative for fatigue.   HENT: Negative for ear pain, rhinorrhea and sore throat.         Feels like food gets stuck occasionally when eating   Eyes: Negative for visual disturbance.   Respiratory: Negative for cough, chest  "tightness, shortness of breath and wheezing.    Cardiovascular: Negative for chest pain and palpitations.   Gastrointestinal: Negative for abdominal distention, abdominal pain, constipation, diarrhea, nausea and vomiting.   Endocrine: Negative for cold intolerance and heat intolerance.   Musculoskeletal: Negative for arthralgias and myalgias.   Skin: Negative for rash.   Neurological: Negative for dizziness, light-headedness, numbness and headaches.   Psychiatric/Behavioral: Negative for dysphoric mood and sleep disturbance. The patient is not nervous/anxious.           Objective    /74   Pulse 70   Temp 97.9  F (36.6  C) (Tympanic)   Resp 16   Ht 1.626 m (5' 4\")   Wt 94.8 kg (209 lb)   BMI 35.87 kg/m    Body mass index is 35.87 kg/m .  Physical Exam  Constitutional:       Appearance: Normal appearance. She is well-developed.   HENT:      Head: Normocephalic and atraumatic.      Right Ear: Tympanic membrane and external ear normal. No middle ear effusion.      Left Ear: Tympanic membrane and external ear normal.  No middle ear effusion.      Nose: No mucosal edema.   Neck:      Thyroid: No thyromegaly.      Vascular: No carotid bruit.   Cardiovascular:      Rate and Rhythm: Normal rate and regular rhythm.      Heart sounds: Normal heart sounds.   Pulmonary:      Effort: Pulmonary effort is normal.      Breath sounds: Normal breath sounds.   Abdominal:      General: Bowel sounds are normal.      Palpations: Abdomen is soft.   Skin:     General: Skin is warm and dry.   Neurological:      Mental Status: She is alert.   Psychiatric:         Behavior: Behavior normal.               "

## 2021-01-19 DIAGNOSIS — Z11.59 ENCOUNTER FOR SCREENING FOR OTHER VIRAL DISEASES: Primary | ICD-10-CM

## 2021-01-19 ASSESSMENT — ANXIETY QUESTIONNAIRES: GAD7 TOTAL SCORE: 0

## 2021-01-20 RX ORDER — CITALOPRAM HYDROBROMIDE 20 MG/1
TABLET ORAL
Qty: 90 TABLET | Refills: 0 | OUTPATIENT
Start: 2021-01-20

## 2021-01-25 ENCOUNTER — OFFICE VISIT (OUTPATIENT)
Dept: LAB | Facility: CLINIC | Age: 56
End: 2021-01-25
Payer: COMMERCIAL

## 2021-01-25 DIAGNOSIS — Z11.59 ENCOUNTER FOR SCREENING FOR OTHER VIRAL DISEASES: ICD-10-CM

## 2021-01-25 LAB
SARS-COV-2 RNA RESP QL NAA+PROBE: NORMAL
SPECIMEN SOURCE: NORMAL

## 2021-01-25 PROCEDURE — U0005 INFEC AGEN DETEC AMPLI PROBE: HCPCS | Performed by: SURGERY

## 2021-01-25 PROCEDURE — U0003 INFECTIOUS AGENT DETECTION BY NUCLEIC ACID (DNA OR RNA); SEVERE ACUTE RESPIRATORY SYNDROME CORONAVIRUS 2 (SARS-COV-2) (CORONAVIRUS DISEASE [COVID-19]), AMPLIFIED PROBE TECHNIQUE, MAKING USE OF HIGH THROUGHPUT TECHNOLOGIES AS DESCRIBED BY CMS-2020-01-R: HCPCS | Performed by: SURGERY

## 2021-01-26 ENCOUNTER — ANESTHESIA EVENT (OUTPATIENT)
Dept: GASTROENTEROLOGY | Facility: CLINIC | Age: 56
End: 2021-01-26
Payer: COMMERCIAL

## 2021-01-26 LAB
LABORATORY COMMENT REPORT: NORMAL
SARS-COV-2 RNA RESP QL NAA+PROBE: NEGATIVE
SPECIMEN SOURCE: NORMAL

## 2021-01-26 NOTE — ANESTHESIA PREPROCEDURE EVALUATION
Anesthesia Pre-Procedure Evaluation    Patient: Melia Baires   MRN: [id number] : 1965        Preoperative Diagnosis: Globus sensation [R09.89]   Procedure : Procedure(s):  ESOPHAGOGASTRODUODENOSCOPY (EGD)     No past medical history on file.   Past Surgical History:   Procedure Laterality Date     COLONOSCOPY N/A 3/10/2020    Procedure: COLONOSCOPY;  Surgeon: Melvin Liu MD;  Location: [facility]     HERNIA REPAIR       HYSTERECTOMY, PAP NO LONGER INDICATED       LAPAROSCOPY      Pelvic      Allergies   Allergen Reactions     Oxycodone GI Disturbance     Severe nausea and vomiting       Social History     Tobacco Use     Smoking status: Former Smoker     Smokeless tobacco: Never Used   Substance Use Topics     Alcohol use: Yes     Comment: rare      Wt Readings from Last 1 Encounters:   21 94.8 kg (209 lb)        Anesthesia Evaluation   Pt has had prior anesthetic. Type: General and MAC.        ROS/MED HX  ENT/Pulmonary:     (+) RIK risk factors, obese, tobacco use, Past use,     Neurologic:  - neg neurologic ROS     Cardiovascular:       METS/Exercise Tolerance:     Hematologic:  - neg hematologic  ROS     Musculoskeletal:  - neg musculoskeletal ROS     GI/Hepatic:     (+) GERD,     Renal/Genitourinary:  - neg Renal ROS     Endo:     (+) thyroid problem, hypothyroidism, Obesity,     Psychiatric/Substance Use:     (+) psychiatric history anxiety, depression and other (comment)     Infectious Disease:  - neg infectious disease ROS     Malignancy:  - neg malignancy ROS     Other:  - neg other ROS          Physical Exam    Airway        Mallampati: II   TM distance: > 3 FB   Neck ROM: full   Mouth opening: > 3 cm    Respiratory Devices and Support         Dental  no notable dental history         Cardiovascular   cardiovascular exam normal          Pulmonary   pulmonary exam normal                OUTSIDE LABS:  CBC:   Lab Results   Component Value Date    WBC 9.2 2020    HGB 15.7 2020     HCT 46.8 01/27/2020     01/27/2020     BMP:   Lab Results   Component Value Date     01/18/2021     01/27/2020    POTASSIUM 4.3 01/18/2021    POTASSIUM 3.9 01/27/2020    CHLORIDE 107 01/18/2021    CHLORIDE 109 01/27/2020    CO2 27 01/18/2021    CO2 24 01/27/2020    BUN 16 01/18/2021    BUN 14 01/27/2020    CR 0.84 01/18/2021    CR 0.76 01/27/2020    GLC 91 01/18/2021    GLC 85 01/27/2020     COAGS: No results found for: PTT, INR, FIBR  POC: No results found for: BGM, HCG, HCGS  HEPATIC:   Lab Results   Component Value Date    ALBUMIN 3.7 01/27/2020    PROTTOTAL 7.3 01/27/2020    ALT 34 01/27/2020    AST 24 01/27/2020    ALKPHOS 63 01/27/2020    BILITOTAL 0.4 01/27/2020     OTHER:   Lab Results   Component Value Date    SHAQ 8.9 01/18/2021    TSH 6.03 (H) 01/18/2021    T4 0.78 01/18/2021       Anesthesia Plan     History & Physical Review      ASA Status:  2. NPO Status:  NPO Appropriate. .  Plan for MAC with Propofol induction. Maintenance will be TIVA. Reason for MAC:  Deep or markedly invasive procedure (G8).         PONV prophylaxis:  Ondansetron (or other 5HT-3) and Droperidol or Haldol.       Consents  Anesthesia Plan(s) and associated risks, benefits, and realistic alternatives discussed.    Questions answered and patient/representative(s) expressed understanding.    Discussed with:  Patient.       Extended Intubation/Ventilatory Support Discussed No No       Postoperative Care  Postoperative pain management: IV analgesics, Oral pain medications and Multi-modal analgesia.           MEME Cox CRNA

## 2021-01-27 ENCOUNTER — ANESTHESIA (OUTPATIENT)
Dept: GASTROENTEROLOGY | Facility: CLINIC | Age: 56
End: 2021-01-27
Payer: COMMERCIAL

## 2021-01-27 ENCOUNTER — HOSPITAL ENCOUNTER (OUTPATIENT)
Facility: CLINIC | Age: 56
Discharge: HOME | End: 2021-01-27
Attending: SURGERY | Admitting: SURGERY
Payer: COMMERCIAL

## 2021-01-27 VITALS
HEIGHT: 64 IN | DIASTOLIC BLOOD PRESSURE: 70 MMHG | TEMPERATURE: 98.2 F | OXYGEN SATURATION: 96 % | BODY MASS INDEX: 35.68 KG/M2 | WEIGHT: 209 LBS | RESPIRATION RATE: 16 BRPM | HEART RATE: 72 BPM | SYSTOLIC BLOOD PRESSURE: 107 MMHG

## 2021-01-27 DIAGNOSIS — K29.71 GASTRITIS WITH HEMORRHAGE, UNSPECIFIED CHRONICITY, UNSPECIFIED GASTRITIS TYPE: Primary | ICD-10-CM

## 2021-01-27 LAB — UPPER GI ENDOSCOPY: NORMAL

## 2021-01-27 PROCEDURE — 250N000009 HC RX 250: Performed by: NURSE ANESTHETIST, CERTIFIED REGISTERED

## 2021-01-27 PROCEDURE — 258N000003 HC RX IP 258 OP 636: Performed by: SURGERY

## 2021-01-27 PROCEDURE — 370N000017 HC ANESTHESIA TECHNICAL FEE, PER MIN: Performed by: SURGERY

## 2021-01-27 PROCEDURE — 43235 EGD DIAGNOSTIC BRUSH WASH: CPT | Performed by: SURGERY

## 2021-01-27 PROCEDURE — 43239 EGD BIOPSY SINGLE/MULTIPLE: CPT | Performed by: SURGERY

## 2021-01-27 PROCEDURE — 88305 TISSUE EXAM BY PATHOLOGIST: CPT | Mod: 26 | Performed by: PATHOLOGY

## 2021-01-27 PROCEDURE — 88305 TISSUE EXAM BY PATHOLOGIST: CPT | Mod: TC | Performed by: SURGERY

## 2021-01-27 PROCEDURE — 250N000011 HC RX IP 250 OP 636: Performed by: NURSE ANESTHETIST, CERTIFIED REGISTERED

## 2021-01-27 PROCEDURE — 250N000009 HC RX 250: Performed by: SURGERY

## 2021-01-27 RX ORDER — FLUMAZENIL 0.1 MG/ML
0.2 INJECTION, SOLUTION INTRAVENOUS
Status: DISCONTINUED | OUTPATIENT
Start: 2021-01-27 | End: 2021-01-27 | Stop reason: HOSPADM

## 2021-01-27 RX ORDER — NALOXONE HYDROCHLORIDE 0.4 MG/ML
0.2 INJECTION, SOLUTION INTRAMUSCULAR; INTRAVENOUS; SUBCUTANEOUS
Status: DISCONTINUED | OUTPATIENT
Start: 2021-01-27 | End: 2021-01-27 | Stop reason: HOSPADM

## 2021-01-27 RX ORDER — SODIUM CHLORIDE, SODIUM LACTATE, POTASSIUM CHLORIDE, CALCIUM CHLORIDE 600; 310; 30; 20 MG/100ML; MG/100ML; MG/100ML; MG/100ML
INJECTION, SOLUTION INTRAVENOUS CONTINUOUS
Status: DISCONTINUED | OUTPATIENT
Start: 2021-01-27 | End: 2021-01-27 | Stop reason: HOSPADM

## 2021-01-27 RX ORDER — PROPOFOL 10 MG/ML
INJECTION, EMULSION INTRAVENOUS PRN
Status: DISCONTINUED | OUTPATIENT
Start: 2021-01-27 | End: 2021-01-27

## 2021-01-27 RX ORDER — OMEPRAZOLE 40 MG/1
40 CAPSULE, DELAYED RELEASE ORAL DAILY
Qty: 30 CAPSULE | Refills: 0 | Status: SHIPPED | OUTPATIENT
Start: 2021-01-27 | End: 2021-03-02

## 2021-01-27 RX ORDER — GLYCOPYRROLATE 0.2 MG/ML
INJECTION, SOLUTION INTRAMUSCULAR; INTRAVENOUS PRN
Status: DISCONTINUED | OUTPATIENT
Start: 2021-01-27 | End: 2021-01-27

## 2021-01-27 RX ORDER — NALOXONE HYDROCHLORIDE 0.4 MG/ML
0.4 INJECTION, SOLUTION INTRAMUSCULAR; INTRAVENOUS; SUBCUTANEOUS
Status: DISCONTINUED | OUTPATIENT
Start: 2021-01-27 | End: 2021-01-27 | Stop reason: HOSPADM

## 2021-01-27 RX ORDER — LIDOCAINE HYDROCHLORIDE 10 MG/ML
INJECTION, SOLUTION INFILTRATION; PERINEURAL PRN
Status: DISCONTINUED | OUTPATIENT
Start: 2021-01-27 | End: 2021-01-27

## 2021-01-27 RX ORDER — ONDANSETRON 2 MG/ML
4 INJECTION INTRAMUSCULAR; INTRAVENOUS
Status: DISCONTINUED | OUTPATIENT
Start: 2021-01-27 | End: 2021-01-27 | Stop reason: HOSPADM

## 2021-01-27 RX ORDER — LIDOCAINE 40 MG/G
CREAM TOPICAL
Status: DISCONTINUED | OUTPATIENT
Start: 2021-01-27 | End: 2021-01-27 | Stop reason: HOSPADM

## 2021-01-27 RX ADMIN — SODIUM CHLORIDE, POTASSIUM CHLORIDE, SODIUM LACTATE AND CALCIUM CHLORIDE: 600; 310; 30; 20 INJECTION, SOLUTION INTRAVENOUS at 06:51

## 2021-01-27 RX ADMIN — PROPOFOL 100 MG: 10 INJECTION, EMULSION INTRAVENOUS at 07:43

## 2021-01-27 RX ADMIN — TOPICAL ANESTHETIC 1 SPRAY: 200 SPRAY DENTAL; PERIODONTAL at 07:41

## 2021-01-27 RX ADMIN — LIDOCAINE HYDROCHLORIDE 100 MG: 10 INJECTION, SOLUTION INFILTRATION; PERINEURAL at 07:43

## 2021-01-27 RX ADMIN — GLYCOPYRROLATE 0.2 MG: 0.2 INJECTION, SOLUTION INTRAMUSCULAR; INTRAVENOUS at 07:43

## 2021-01-27 RX ADMIN — LIDOCAINE HYDROCHLORIDE 1 ML: 10 INJECTION, SOLUTION EPIDURAL; INFILTRATION; INTRACAUDAL; PERINEURAL at 06:51

## 2021-01-27 RX ADMIN — PROPOFOL 100 MG: 10 INJECTION, EMULSION INTRAVENOUS at 07:47

## 2021-01-27 ASSESSMENT — LIFESTYLE VARIABLES: TOBACCO_USE: 1

## 2021-01-27 ASSESSMENT — MIFFLIN-ST. JEOR: SCORE: 1518.02

## 2021-01-27 NOTE — ANESTHESIA CARE TRANSFER NOTE
Patient: Melia Baires    Procedure(s):  ESOPHAGOGASTRODUODENOSCOPY (EGD)    Diagnosis: Globus sensation [R09.89]  Diagnosis Additional Information: No value filed.    Anesthesia Type:   MAC     Note:    Oropharynx: oropharynx clear of all foreign objects  Level of Consciousness: drowsy  Oxygen Supplementation: nasal cannula    Independent Airway: airway patency satisfactory and stable  Dentition: dentition unchanged  Vital Signs Stable: post-procedure vital signs reviewed and stable  Report to RN Given: handoff report given  Patient transferred to: Phase II    Handoff Report: Identifed the Patient, Identified the Reponsible Provider, Reviewed the pertinent medical history, Discussed the surgical course, Reviewed Intra-OP anesthesia mangement and issues during anesthesia, Set expectations for post-procedure period and Allowed opportunity for questions and acknowledgement of understanding      Vitals: (Last set prior to Anesthesia Care Transfer)  CRNA VITALS  1/27/2021 0720 - 1/27/2021 0751      1/27/2021             Pulse:  90    Ht Rate:  90    SpO2:  96 %        Electronically Signed By: MEME De Santiago CRNA  January 27, 2021  7:51 AM

## 2021-01-27 NOTE — H&P
55 year old year old female here for upper endoscopy for regurgitation and dysphagia.  Ongoing since 11/2020. She has had egd with dilation in the past.  History of reflux.  Denies weight loss.  No difficulty with liquids, only solids at times.        Patient Active Problem List   Diagnosis     Colon polyps     GERD (gastroesophageal reflux disease)     IBS (irritable bowel syndrome)     Panic disorder     Spigelian hernia     Morbid obesity (H)     Anxiety and depression     Hypothyroidism, unspecified type       No past medical history on file.    Past Surgical History:   Procedure Laterality Date     COLONOSCOPY N/A 3/10/2020    Procedure: COLONOSCOPY;  Surgeon: Melvin Liu MD;  Location: [facility]     HERNIA REPAIR       HYSTERECTOMY, PAP NO LONGER INDICATED  2002     LAPAROSCOPY      Pelvic       Family History   Problem Relation Age of Onset     Pacemaker Father      Diabetes Type 2  Father      Anxiety Disorder Mother      Hypertension Mother      Pacemaker Mother      Macular Degeneration Mother        No current outpatient medications on file.       Allergies   Allergen Reactions     Oxycodone GI Disturbance     Severe nausea and vomiting        Pt reports that she has quit smoking. She has never used smokeless tobacco. She reports current alcohol use. She reports that she does not use drugs.    Exam:    Awake, Alert OX3  Lungs - CTA bilaterally  CV - RRR, no murmurs, distal pulses intact  Abd - soft, non-distended, non-tender, +BS  Extr - No cyanosis or edema    A/P 55 year old year old female in need of upper endoscopy for dysphagia and regurgitation. Risks, benefits, alternatives, and complications were discussed including the possibility of perforation and the patient agreed to proceed.    Oleg Car, DO on 1/27/2021 at 7:38 AM

## 2021-01-27 NOTE — ANESTHESIA POSTPROCEDURE EVALUATION
Patient: Melia Baires    Procedure(s):  ESOPHAGOGASTRODUODENOSCOPY (EGD)    Diagnosis:Globus sensation [R09.89]  Diagnosis Additional Information: No value filed.    Anesthesia Type:  MAC    Note:  Disposition: Outpatient   Postop Pain Control: Uneventful            Sign Out: Well controlled pain   PONV: No   Neuro/Psych: Uneventful            Sign Out: Acceptable/Baseline neuro status   Airway/Respiratory: Uneventful            Sign Out: Acceptable/Baseline resp. status   CV/Hemodynamics: Uneventful            Sign Out: Acceptable CV status   Other NRE: NONE   DID A NON-ROUTINE EVENT OCCUR? No         Last vitals:  Vitals:    01/27/21 0621   BP: 107/73   Pulse: 78   Temp: 36.8  C (98.2  F)   SpO2: 97%       Electronically Signed By: MEME De Santiago CRNA  January 27, 2021  7:56 AM

## 2021-01-28 LAB — COPATH REPORT: NORMAL

## 2021-01-29 ENCOUNTER — MYC MEDICAL ADVICE (OUTPATIENT)
Dept: FAMILY MEDICINE | Facility: CLINIC | Age: 56
End: 2021-01-29

## 2021-01-29 NOTE — TELEPHONE ENCOUNTER
I reviewed her upper endoscopy.  It is consistent with gastritis, duodenitis, and esophagitis.  This condition is not treated with antibiotics it is treated with antireflux medication.  Recommend continuing to take the omeprazole 40 mg orally daily.  Also, needs to avoid over-the-counter ibuprofen, Advil, Aleve and naproxen.  It is okay to take Tylenol or acetaminophen if needed.  It will take some time for this to heal and resolve.  If drinks alcohol would recommend stopping as well.  Also, should try and decrease caffeine intake.    Jyoti Harris NP-C

## 2021-01-29 NOTE — TELEPHONE ENCOUNTER
I called patient for more information. She states that she had her Upper GI Endoscopy done on Wednesday 1/27/21. This was ordered by Jyoti Harris (after her clinic visit on 1/18/21).     Patient was notified of results via My Chart and told that her PCP would manage this for her.     I offered patient a Virtual Visit with Sadaf Bobo today, however she wanted to see if she could be treated without another appointment.     Please review and advise.     Palak Torres RN BSN  [facility] [place]

## 2021-01-29 NOTE — TELEPHONE ENCOUNTER
Directives from Jyoti Harris NP-C reviewed with Lori by phone and she verbalized a good understanding.     Information also sent to patient via My Chart.     Palak Torres RN BSN  [facility]

## 2021-03-02 ENCOUNTER — MYC REFILL (OUTPATIENT)
Dept: FAMILY MEDICINE | Facility: CLINIC | Age: 56
End: 2021-03-02

## 2021-03-02 DIAGNOSIS — K29.71 GASTRITIS WITH HEMORRHAGE, UNSPECIFIED CHRONICITY, UNSPECIFIED GASTRITIS TYPE: ICD-10-CM

## 2021-03-02 RX ORDER — OMEPRAZOLE 40 MG/1
40 CAPSULE, DELAYED RELEASE ORAL DAILY
Qty: 30 CAPSULE | Refills: 0 | Status: SHIPPED | OUTPATIENT
Start: 2021-03-02 | End: 2021-12-20

## 2021-04-05 ENCOUNTER — IMMUNIZATION (OUTPATIENT)
Dept: FAMILY MEDICINE | Facility: CLINIC | Age: 56
End: 2021-04-05
Payer: COMMERCIAL

## 2021-04-05 PROCEDURE — 91301 PR COVID VAC MODERNA 100 MCG/0.5 ML IM: CPT

## 2021-04-05 PROCEDURE — 0011A PR COVID VAC MODERNA 100 MCG/0.5 ML IM: CPT

## 2021-05-03 ENCOUNTER — IMMUNIZATION (OUTPATIENT)
Dept: FAMILY MEDICINE | Facility: CLINIC | Age: 56
End: 2021-05-03
Attending: FAMILY MEDICINE
Payer: COMMERCIAL

## 2021-05-03 PROCEDURE — 91301 PR COVID VAC MODERNA 100 MCG/0.5 ML IM: CPT

## 2021-05-03 PROCEDURE — 0012A PR COVID VAC MODERNA 100 MCG/0.5 ML IM: CPT

## 2021-05-09 ENCOUNTER — HEALTH MAINTENANCE LETTER (OUTPATIENT)
Age: 56
End: 2021-05-09

## 2021-12-17 NOTE — PROGRESS NOTES
"  Assessment & Plan     Anxiety and depression  Stable-doing well on current dose of citalopram.  Refills given.  Plan to follow-up in 1 year or sooner if needed  - citalopram (CELEXA) 20 MG tablet; Take 1 tablet (20 mg) by mouth daily    Hypothyroidism, unspecified type  Tolerating current dose well.  We will recheck labs here today.  We will send refill once labs return  - REVIEW OF HEALTH MAINTENANCE PROTOCOL ORDERS  - Vitamin D Deficiency; Future  - TSH with free T4 reflex; Future    Hyperlipidemia LDL goal <100  Previous lipids reviewed.  We will plan to recheck here today.  Low threshold for starting on statin-briefly discussed with patient in clinic.  - Lipid panel reflex to direct LDL Fasting; Future    Screening for diabetes mellitus  - Glucose; Future    Encounter for screening mammogram for breast cancer  - MA Screening Digital Bilateral; Future    Impacted cerumen of left ear  Treatment plan reviewed and understood by patient.  Reviewed importance of not using Q-tips in ear canal.  Instructed to call or return for:   - Ear pain or drainage   - Decrease hearing   - Symptoms worsen or fail to resolve in 3 days        Review of the result(s) of each unique test - Imaging, labs, colonoscopy  Ordering of each unique test  Prescription drug management  25 minutes spent on the date of the encounter doing chart review, history and exam, documentation and further activities per the note     BMI:   Estimated body mass index is 35.87 kg/m  as calculated from the following:    Height as of 1/27/21: 1.626 m (5' 4\").    Weight as of 1/27/21: 94.8 kg (209 lb).       No follow-ups on file.    MEME Geronimo CNP  [facility] RAMSES Sandoval is a 56 year old who presents for the following health issues     HPI     Chief Complaint   Patient presents with     Recheck Medication     pt is fasting       Depression and Anxiety Follow-Up    How are you doing with your depression since your last " visit? No change    How are you doing with your anxiety since your last visit?  No change    Are you having other symptoms that might be associated with depression or anxiety? No    Have you had a significant life event? No     Do you have any concerns with your use of alcohol or other drugs? No     Patient declines updating PHQ and MARISSA.    Social History     Tobacco Use     Smoking status: Former Smoker     Smokeless tobacco: Never Used   Vaping Use     Vaping Use: Never used   Substance Use Topics     Alcohol use: Yes     Comment: rare     Drug use: Never     PHQ 1/27/2020 1/18/2021   PHQ-9 Total Score 0 0   Q9: Thoughts of better off dead/self-harm past 2 weeks Not at all Not at all     MARISSA-7 SCORE 1/27/2020 1/18/2021   Total Score 4 0     Last PHQ-9 1/18/2021   1.  Little interest or pleasure in doing things 0   2.  Feeling down, depressed, or hopeless 0   3.  Trouble falling or staying asleep, or sleeping too much 0   4.  Feeling tired or having little energy 0   5.  Poor appetite or overeating 0   6.  Feeling bad about yourself 0   7.  Trouble concentrating 0   8.  Moving slowly or restless 0   Q9: Thoughts of better off dead/self-harm past 2 weeks 0   PHQ-9 Total Score 0     MARISSA-7  1/18/2021   1. Feeling nervous, anxious, or on edge 0   2. Not being able to stop or control worrying 0   3. Worrying too much about different things 0   4. Trouble relaxing 0   5. Being so restless that it is hard to sit still 0   6. Becoming easily annoyed or irritable 0   7. Feeling afraid, as if something awful might happen 0   MARISSA-7 Total Score 0       Suicide Assessment Five-step Evaluation and Treatment (SAFE-T)    Hypothyroidism Follow-up      Since last visit, patient describes the following symptoms: Weight stable, no hair loss, no skin changes, no constipation, no loose stools      Here today for medication check.  Is fasting this morning for labs.  Has been taking all medications and tolerating them well.  No side  effects.  Feels like anxiety and depression is well controlled.  Feels like is where wants to be.    Quit smoking over 15 years ago.  Hysterectomy due to noncancerous reasons.  Had HIV and hepatitis C screening done in 2016-negative.    Review of Systems   Constitutional: Negative for fatigue.   HENT: Negative for ear pain, rhinorrhea and sore throat.    Eyes: Negative for visual disturbance.   Respiratory: Negative for cough, chest tightness, shortness of breath and wheezing.    Cardiovascular: Negative for chest pain and palpitations.   Gastrointestinal: Negative for abdominal distention, abdominal pain, constipation, diarrhea, nausea and vomiting.   Endocrine: Negative for cold intolerance and heat intolerance.   Musculoskeletal: Negative for arthralgias and myalgias.   Skin: Negative for rash.   Neurological: Negative for dizziness, light-headedness, numbness and headaches.   Psychiatric/Behavioral: Negative for dysphoric mood and sleep disturbance. The patient is nervous/anxious (well controlled).             Objective    /64 (BP Location: Right arm, Patient Position: Sitting, Cuff Size: Adult Large)   Pulse 76   Temp 97.4  F (36.3  C) (Tympanic)   Resp 16   There is no height or weight on file to calculate BMI.  Physical Exam  Constitutional:       Appearance: Normal appearance. She is well-developed.   HENT:      Head: Normocephalic and atraumatic.      Right Ear: Tympanic membrane and external ear normal. No middle ear effusion.      Left Ear: External ear normal. There is impacted cerumen.      Ears:      Comments: Unable to visualize tympanic membrane due to obscurity by cerumen.  Impacted cerumen removal completed to left ear(s) per irrigation.  Procedure completed without difficulty by CMA.             Nose: No mucosal edema.   Neck:      Thyroid: No thyromegaly.      Vascular: No carotid bruit.   Cardiovascular:      Rate and Rhythm: Normal rate and regular rhythm.      Heart sounds: Normal  heart sounds.   Pulmonary:      Effort: Pulmonary effort is normal.      Breath sounds: Normal breath sounds.   Abdominal:      General: Bowel sounds are normal.      Palpations: Abdomen is soft.   Skin:     General: Skin is warm and dry.   Neurological:      Mental Status: She is alert.   Psychiatric:         Behavior: Behavior normal.

## 2021-12-20 ENCOUNTER — OFFICE VISIT (OUTPATIENT)
Dept: FAMILY MEDICINE | Facility: CLINIC | Age: 56
End: 2021-12-20
Payer: COMMERCIAL

## 2021-12-20 VITALS
RESPIRATION RATE: 16 BRPM | TEMPERATURE: 97.4 F | DIASTOLIC BLOOD PRESSURE: 64 MMHG | SYSTOLIC BLOOD PRESSURE: 100 MMHG | HEART RATE: 76 BPM

## 2021-12-20 DIAGNOSIS — Z13.1 SCREENING FOR DIABETES MELLITUS: ICD-10-CM

## 2021-12-20 DIAGNOSIS — H61.22 IMPACTED CERUMEN OF LEFT EAR: ICD-10-CM

## 2021-12-20 DIAGNOSIS — F32.A ANXIETY AND DEPRESSION: ICD-10-CM

## 2021-12-20 DIAGNOSIS — Z12.31 ENCOUNTER FOR SCREENING MAMMOGRAM FOR BREAST CANCER: ICD-10-CM

## 2021-12-20 DIAGNOSIS — E03.9 HYPOTHYROIDISM, UNSPECIFIED TYPE: ICD-10-CM

## 2021-12-20 DIAGNOSIS — F41.9 ANXIETY AND DEPRESSION: ICD-10-CM

## 2021-12-20 DIAGNOSIS — E78.5 HYPERLIPIDEMIA LDL GOAL <100: Primary | ICD-10-CM

## 2021-12-20 LAB
CHOLEST SERPL-MCNC: 190 MG/DL
DEPRECATED CALCIDIOL+CALCIFEROL SERPL-MC: 47 UG/L (ref 20–75)
FASTING STATUS PATIENT QL REPORTED: YES
FASTING STATUS PATIENT QL REPORTED: YES
GLUCOSE BLD-MCNC: 94 MG/DL (ref 70–99)
HDLC SERPL-MCNC: 48 MG/DL
LDLC SERPL CALC-MCNC: 99 MG/DL
NONHDLC SERPL-MCNC: 142 MG/DL
T4 FREE SERPL-MCNC: 0.84 NG/DL (ref 0.76–1.46)
TRIGL SERPL-MCNC: 217 MG/DL
TSH SERPL DL<=0.005 MIU/L-ACNC: 4.86 MU/L (ref 0.4–4)

## 2021-12-20 PROCEDURE — 80061 LIPID PANEL: CPT | Performed by: NURSE PRACTITIONER

## 2021-12-20 PROCEDURE — 84443 ASSAY THYROID STIM HORMONE: CPT | Performed by: NURSE PRACTITIONER

## 2021-12-20 PROCEDURE — 82306 VITAMIN D 25 HYDROXY: CPT | Performed by: NURSE PRACTITIONER

## 2021-12-20 PROCEDURE — 36415 COLL VENOUS BLD VENIPUNCTURE: CPT | Performed by: NURSE PRACTITIONER

## 2021-12-20 PROCEDURE — 84439 ASSAY OF FREE THYROXINE: CPT | Performed by: NURSE PRACTITIONER

## 2021-12-20 PROCEDURE — 82947 ASSAY GLUCOSE BLOOD QUANT: CPT | Performed by: NURSE PRACTITIONER

## 2021-12-20 PROCEDURE — 99214 OFFICE O/P EST MOD 30 MIN: CPT | Performed by: NURSE PRACTITIONER

## 2021-12-20 RX ORDER — CITALOPRAM HYDROBROMIDE 20 MG/1
20 TABLET ORAL DAILY
Qty: 90 TABLET | Refills: 3 | Status: SHIPPED | OUTPATIENT
Start: 2021-12-20 | End: 2022-11-14

## 2021-12-20 RX ORDER — LEVOTHYROXINE SODIUM 25 UG/1
25 TABLET ORAL DAILY
Qty: 90 TABLET | Refills: 3 | Status: CANCELLED | OUTPATIENT
Start: 2021-12-20

## 2021-12-20 RX ORDER — LEVOTHYROXINE SODIUM 25 UG/1
25 TABLET ORAL DAILY
Qty: 90 TABLET | Refills: 3 | Status: SHIPPED | OUTPATIENT
Start: 2021-12-20 | End: 2022-02-18

## 2021-12-20 ASSESSMENT — ENCOUNTER SYMPTOMS
MYALGIAS: 0
VOMITING: 0
RHINORRHEA: 0
SHORTNESS OF BREATH: 0
WHEEZING: 0
CHEST TIGHTNESS: 0
DYSPHORIC MOOD: 0
DIZZINESS: 0
NUMBNESS: 0
HEADACHES: 0
DIARRHEA: 0
ABDOMINAL DISTENTION: 0
NERVOUS/ANXIOUS: 1
ABDOMINAL PAIN: 0
FATIGUE: 0
SLEEP DISTURBANCE: 0
PALPITATIONS: 0
CONSTIPATION: 0
ARTHRALGIAS: 0
NAUSEA: 0
SORE THROAT: 0
LIGHT-HEADEDNESS: 0
COUGH: 0

## 2021-12-20 ASSESSMENT — PAIN SCALES - GENERAL: PAINLEVEL: NO PAIN (0)

## 2022-01-17 ENCOUNTER — ANCILLARY PROCEDURE (OUTPATIENT)
Dept: MAMMOGRAPHY | Facility: CLINIC | Age: 57
End: 2022-01-17
Payer: COMMERCIAL

## 2022-01-17 DIAGNOSIS — Z12.31 ENCOUNTER FOR SCREENING MAMMOGRAM FOR BREAST CANCER: ICD-10-CM

## 2022-01-17 PROCEDURE — 77063 BREAST TOMOSYNTHESIS BI: CPT | Mod: TC | Performed by: RADIOLOGY

## 2022-01-17 PROCEDURE — 77067 SCR MAMMO BI INCL CAD: CPT | Mod: TC | Performed by: RADIOLOGY

## 2022-02-16 DIAGNOSIS — E03.9 HYPOTHYROIDISM, UNSPECIFIED TYPE: ICD-10-CM

## 2022-02-18 RX ORDER — LEVOTHYROXINE SODIUM 25 UG/1
25 TABLET ORAL DAILY
Qty: 90 TABLET | Refills: 2 | Status: SHIPPED | OUTPATIENT
Start: 2022-02-18 | End: 2022-11-14

## 2022-02-18 NOTE — TELEPHONE ENCOUNTER
"See follow up plan:    12/20/2021 Jyoti Harris, MEME CNP Office Visit  Return in about 1 year (around 12/20/2022) for A Physical Exam, with fasting labs.         Levothyroxine 25 mcg was sent to requesting pharmacy for 90 days with 3 refills on 12/20/21.    Does she need the brand \"Euthyrox\"?  I don't see Euthyrox on past med lists.      I called and spoke to the pharmacist at [facility].   Levothyroxine and euthyrox are interchangeable.   They don't seem to have the Rx sent in December.    Re-sent now.    Fatuma Pantoja RN  [facility]      "

## 2022-06-05 ENCOUNTER — HEALTH MAINTENANCE LETTER (OUTPATIENT)
Age: 57
End: 2022-06-05

## 2022-10-15 ENCOUNTER — HEALTH MAINTENANCE LETTER (OUTPATIENT)
Age: 57
End: 2022-10-15

## 2022-12-14 ENCOUNTER — OFFICE VISIT (OUTPATIENT)
Dept: FAMILY MEDICINE | Facility: CLINIC | Age: 57
End: 2022-12-14
Payer: COMMERCIAL

## 2022-12-14 VITALS
WEIGHT: 197 LBS | OXYGEN SATURATION: 96 % | SYSTOLIC BLOOD PRESSURE: 110 MMHG | HEART RATE: 87 BPM | BODY MASS INDEX: 33.81 KG/M2 | DIASTOLIC BLOOD PRESSURE: 70 MMHG | TEMPERATURE: 97.5 F

## 2022-12-14 DIAGNOSIS — R60.9 EDEMA, UNSPECIFIED TYPE: ICD-10-CM

## 2022-12-14 DIAGNOSIS — F32.A ANXIETY AND DEPRESSION: Primary | ICD-10-CM

## 2022-12-14 DIAGNOSIS — F41.9 ANXIETY AND DEPRESSION: Primary | ICD-10-CM

## 2022-12-14 DIAGNOSIS — E78.5 HYPERLIPIDEMIA LDL GOAL <100: ICD-10-CM

## 2022-12-14 DIAGNOSIS — Z12.31 ENCOUNTER FOR SCREENING MAMMOGRAM FOR BREAST CANCER: ICD-10-CM

## 2022-12-14 DIAGNOSIS — Z71.89 ADVANCED CARE PLANNING/COUNSELING DISCUSSION: ICD-10-CM

## 2022-12-14 DIAGNOSIS — E03.9 HYPOTHYROIDISM, UNSPECIFIED TYPE: ICD-10-CM

## 2022-12-14 LAB
ANION GAP SERPL CALCULATED.3IONS-SCNC: 4 MMOL/L (ref 3–14)
BUN SERPL-MCNC: 16 MG/DL (ref 7–30)
CALCIUM SERPL-MCNC: 9.2 MG/DL (ref 8.5–10.1)
CHLORIDE BLD-SCNC: 108 MMOL/L (ref 94–109)
CHOLEST SERPL-MCNC: 209 MG/DL
CO2 SERPL-SCNC: 26 MMOL/L (ref 20–32)
CREAT SERPL-MCNC: 0.79 MG/DL (ref 0.52–1.04)
FASTING STATUS PATIENT QL REPORTED: YES
GFR SERPL CREATININE-BSD FRML MDRD: 86 ML/MIN/1.73M2
GLUCOSE BLD-MCNC: 98 MG/DL (ref 70–99)
HDLC SERPL-MCNC: 48 MG/DL
LDLC SERPL CALC-MCNC: 127 MG/DL
NONHDLC SERPL-MCNC: 161 MG/DL
POTASSIUM BLD-SCNC: 4.1 MMOL/L (ref 3.4–5.3)
SODIUM SERPL-SCNC: 138 MMOL/L (ref 133–144)
T4 FREE SERPL-MCNC: 0.85 NG/DL (ref 0.76–1.46)
TRIGL SERPL-MCNC: 171 MG/DL
TSH SERPL DL<=0.005 MIU/L-ACNC: 6.18 MU/L (ref 0.4–4)

## 2022-12-14 PROCEDURE — 99214 OFFICE O/P EST MOD 30 MIN: CPT | Performed by: NURSE PRACTITIONER

## 2022-12-14 PROCEDURE — 0134A COVID-19 VACCINE BIVALENT BOOSTER 18+ (MODERNA): CPT | Performed by: NURSE PRACTITIONER

## 2022-12-14 PROCEDURE — 80048 BASIC METABOLIC PNL TOTAL CA: CPT | Performed by: NURSE PRACTITIONER

## 2022-12-14 PROCEDURE — 91313 COVID-19 VACCINE BIVALENT BOOSTER 18+ (MODERNA): CPT | Performed by: NURSE PRACTITIONER

## 2022-12-14 PROCEDURE — 36415 COLL VENOUS BLD VENIPUNCTURE: CPT | Performed by: NURSE PRACTITIONER

## 2022-12-14 PROCEDURE — 84443 ASSAY THYROID STIM HORMONE: CPT | Performed by: NURSE PRACTITIONER

## 2022-12-14 PROCEDURE — 80061 LIPID PANEL: CPT | Performed by: NURSE PRACTITIONER

## 2022-12-14 PROCEDURE — 84439 ASSAY OF FREE THYROXINE: CPT | Performed by: NURSE PRACTITIONER

## 2022-12-14 RX ORDER — CITALOPRAM HYDROBROMIDE 20 MG/1
20 TABLET ORAL DAILY
Qty: 90 TABLET | Refills: 3 | Status: SHIPPED | OUTPATIENT
Start: 2022-12-14 | End: 2023-11-24

## 2022-12-14 RX ORDER — FUROSEMIDE 20 MG
TABLET ORAL
Qty: 10 TABLET | Refills: 0 | Status: SHIPPED | OUTPATIENT
Start: 2022-12-14 | End: 2023-12-06

## 2022-12-14 RX ORDER — FUROSEMIDE 20 MG
20 TABLET ORAL DAILY
Qty: 10 TABLET | Refills: 0 | Status: SHIPPED | OUTPATIENT
Start: 2022-12-14 | End: 2022-12-14

## 2022-12-14 RX ORDER — LEVOTHYROXINE SODIUM 25 UG/1
25 TABLET ORAL DAILY
Qty: 90 TABLET | Refills: 3 | Status: SHIPPED | OUTPATIENT
Start: 2022-12-14 | End: 2023-11-24

## 2022-12-14 RX ORDER — ASPIRIN 81 MG/1
81 TABLET, CHEWABLE ORAL DAILY
Qty: 90 TABLET | Refills: 3 | Status: SHIPPED | OUTPATIENT
Start: 2022-12-14 | End: 2023-12-06

## 2022-12-14 ASSESSMENT — ENCOUNTER SYMPTOMS
RHINORRHEA: 0
NUMBNESS: 0
NAUSEA: 0
SHORTNESS OF BREATH: 0
DIZZINESS: 0
CONSTIPATION: 0
SORE THROAT: 0
CHEST TIGHTNESS: 0
DIARRHEA: 0
MYALGIAS: 0
HEADACHES: 0
FATIGUE: 0
PALPITATIONS: 0
ABDOMINAL DISTENTION: 0
LIGHT-HEADEDNESS: 0
DYSPHORIC MOOD: 0
ABDOMINAL PAIN: 0
SLEEP DISTURBANCE: 0
NERVOUS/ANXIOUS: 0
COUGH: 0
VOMITING: 0
WHEEZING: 0
ARTHRALGIAS: 1

## 2022-12-14 ASSESSMENT — PATIENT HEALTH QUESTIONNAIRE - PHQ9
10. IF YOU CHECKED OFF ANY PROBLEMS, HOW DIFFICULT HAVE THESE PROBLEMS MADE IT FOR YOU TO DO YOUR WORK, TAKE CARE OF THINGS AT HOME, OR GET ALONG WITH OTHER PEOPLE: NOT DIFFICULT AT ALL
SUM OF ALL RESPONSES TO PHQ QUESTIONS 1-9: 0
SUM OF ALL RESPONSES TO PHQ QUESTIONS 1-9: 0

## 2022-12-14 NOTE — PROGRESS NOTES
Assessment & Plan     Anxiety and depression  Stable-doing well on current.  Refills given.  Plan to follow-up in 1 year.  - aspirin (ASA) 81 MG chewable tablet; Take 1 tablet (81 mg) by mouth daily  - citalopram (CELEXA) 20 MG tablet; Take 1 tablet (20 mg) by mouth daily    Hypothyroidism, unspecified type  We will recheck labs here today.  Refill given.  Plan to follow-up in 1 year  - levothyroxine (SYNTHROID/LEVOTHROID) 25 MCG tablet; Take 1 tablet (25 mcg) by mouth daily  - Basic metabolic panel; Future  - TSH with free T4 reflex; Future    Advanced care planning/counseling discussion  Copy of healthcare directive and POLST given.  Encouraged to return forms to clinic when completed    Edema, unspecified type  Education given.  Small prescription sent for furosemide to take as needed  - furosemide (LASIX) 20 MG tablet; Take 1 tablet daily as needed for swelling    Hyperlipidemia LDL goal <100  Is fasting-we will recheck here today.  - Lipid panel reflex to direct LDL Fasting; Future    Encounter for screening mammogram for breast cancer  Order placed, plans to call per self and set up  - *MA Screening Digital Bilateral; Future    Review of the result(s) of each unique test - Labs, imaging  Ordering of each unique test  Prescription drug management  25 minutes spent on the date of the encounter doing chart review, history and exam, documentation and further activities per the note       Return in about 1 year (around 12/14/2023) for A Physical Exam, with fasting labs.    MEME Geronimo [facility] RAMSES Sandoval is a 57 year old accompanied by her spouse, presenting for the following health issues:  Recheck Medication      History of Present Illness       Reason for visit:  Medication refill    She eats 2-3 servings of fruits and vegetables daily.She consumes 0 sweetened beverage(s) daily.She exercises with enough effort to increase her heart rate 9 or less minutes per day.   She exercises with enough effort to increase her heart rate 3 or less days per week.   She is taking medications regularly.    Today's PHQ-9         PHQ-9 Total Score: 0    PHQ-9 Q9 Thoughts of better off dead/self-harm past 2 weeks :   Not at all    How difficult have these problems made it for you to do your work, take care of things at home, or get along with other people: Not difficult at all         Here today with  for medication check.  Is fasting today for labs.  Hysterectomy in the past.  Reports at that time provider recommend having hysterectomy due to no longer wanting children.  No previous abnormal Paps that is aware of.    Smoking history.  Quit smoking when could no longer smoke in bars.  1 package of cigarettes would last over a 1 week.    Feels like citalopram is working well.  No negative side effects.  Feels like is where wants to be and is doing well on current.    Has been taking levothyroxine daily.  Has not missed any doses.    Does have some aching joints to hands.  Significant family history of osteoarthritis.    When travels legs will get swollen.  Has times that is not able to put shoes on when is traveling due to swelling in legs.  Would like to get prescription for fluid pill to take as needed.    Review of Systems   Constitutional: Negative for fatigue.   HENT: Negative for ear pain, rhinorrhea and sore throat.    Eyes: Negative for visual disturbance.   Respiratory: Negative for cough, chest tightness, shortness of breath and wheezing.    Cardiovascular: Negative for chest pain and palpitations.   Gastrointestinal: Negative for abdominal distention, abdominal pain, constipation, diarrhea, nausea and vomiting.   Endocrine: Negative for cold intolerance and heat intolerance.   Musculoskeletal: Positive for arthralgias. Negative for myalgias.   Skin: Negative for rash.   Neurological: Negative for dizziness, light-headedness, numbness and headaches.   Psychiatric/Behavioral:  Negative for dysphoric mood and sleep disturbance. The patient is not nervous/anxious.           Objective    /70 (BP Location: Left arm, Patient Position: Chair, Cuff Size: Adult Regular)   Pulse 87   Temp 97.5  F (36.4  C) (Temporal)   Wt 89.4 kg (197 lb)   SpO2 96%   BMI 33.81 kg/m    Body mass index is 33.81 kg/m .  Physical Exam  Constitutional:       Appearance: Normal appearance. She is well-developed and well-nourished.   HENT:      Head: Normocephalic and atraumatic.      Right Ear: Tympanic membrane and external ear normal. No middle ear effusion.      Left Ear: Tympanic membrane and external ear normal.  No middle ear effusion.      Nose: No mucosal edema.      Mouth/Throat:      Mouth: Oropharynx is clear and moist and mucous membranes are normal.   Neck:      Thyroid: No thyromegaly.      Vascular: No carotid bruit.   Cardiovascular:      Rate and Rhythm: Normal rate and regular rhythm.      Heart sounds: Normal heart sounds.   Pulmonary:      Effort: Pulmonary effort is normal.      Breath sounds: Normal breath sounds.   Abdominal:      General: Bowel sounds are normal.      Palpations: Abdomen is soft.   Musculoskeletal:         General: No edema.   Skin:     General: Skin is warm and dry.   Neurological:      Mental Status: She is alert.   Psychiatric:         Mood and Affect: Mood and affect normal.         Behavior: Behavior normal.

## 2022-12-14 NOTE — PATIENT INSTRUCTIONS
Please increase your calcium to 2 tabs daily.  Should be taking a total of 1200 mg of calcium carbonate daily.    You can call imaging scheduling to set up appointment date, time, and location that works best for you to have mammogram [phone]    Please complete the healthcare directive and POLST form and return to clinic.

## 2022-12-16 DIAGNOSIS — E03.9 HYPOTHYROIDISM, UNSPECIFIED TYPE: ICD-10-CM

## 2022-12-16 RX ORDER — LEVOTHYROXINE SODIUM 25 UG/1
TABLET ORAL
Qty: 30 TABLET | Refills: 0 | OUTPATIENT
Start: 2022-12-16

## 2022-12-19 ENCOUNTER — TELEPHONE (OUTPATIENT)
Dept: FAMILY MEDICINE | Facility: CLINIC | Age: 57
End: 2022-12-19

## 2022-12-22 ENCOUNTER — DOCUMENTATION ONLY (OUTPATIENT)
Dept: OTHER | Facility: CLINIC | Age: 57
End: 2022-12-22

## 2022-12-29 ENCOUNTER — DOCUMENTATION ONLY (OUTPATIENT)
Dept: OTHER | Facility: CLINIC | Age: 57
End: 2022-12-29

## 2023-01-18 ENCOUNTER — ANCILLARY PROCEDURE (OUTPATIENT)
Dept: MAMMOGRAPHY | Facility: HOSPITAL | Age: 58
End: 2023-01-18
Attending: NURSE PRACTITIONER
Payer: COMMERCIAL

## 2023-01-18 DIAGNOSIS — Z12.31 ENCOUNTER FOR SCREENING MAMMOGRAM FOR BREAST CANCER: ICD-10-CM

## 2023-01-18 PROCEDURE — 77067 SCR MAMMO BI INCL CAD: CPT

## 2023-03-10 ENCOUNTER — DOCUMENTATION ONLY (OUTPATIENT)
Dept: OTHER | Facility: CLINIC | Age: 58
End: 2023-03-10
Payer: COMMERCIAL

## 2023-06-11 ENCOUNTER — HEALTH MAINTENANCE LETTER (OUTPATIENT)
Age: 58
End: 2023-06-11

## 2023-11-24 DIAGNOSIS — F32.A ANXIETY AND DEPRESSION: ICD-10-CM

## 2023-11-24 DIAGNOSIS — E03.9 HYPOTHYROIDISM, UNSPECIFIED TYPE: ICD-10-CM

## 2023-11-24 DIAGNOSIS — F41.9 ANXIETY AND DEPRESSION: ICD-10-CM

## 2023-11-24 RX ORDER — LEVOTHYROXINE SODIUM 25 UG/1
25 TABLET ORAL DAILY
Qty: 90 TABLET | Refills: 0 | Status: SHIPPED | OUTPATIENT
Start: 2023-11-24 | End: 2023-12-06

## 2023-11-24 RX ORDER — CITALOPRAM HYDROBROMIDE 20 MG/1
20 TABLET ORAL DAILY
Qty: 90 TABLET | Refills: 0 | Status: SHIPPED | OUTPATIENT
Start: 2023-11-24 | End: 2023-12-06

## 2023-11-29 NOTE — COMMUNITY RESOURCES LIST (ENGLISH)
11/29/2023   [facility]  N/A  For questions about this resource list or additional care needs, please contact your primary care clinic or care manager.  Phone: [phone]   Email: N/A   Address: [address] [place] [address]   Hours: N/A        Hotlines and Helplines       Hotline - Housing crisis  1  [facility] Housing Resource Line Distance: 12.26 miles      Phone/Virtual   [address] [place] [address]  Language: English  Hours: Mon - Sun Open 24 Hours   Phone: (258) 912-5963 Website: https://www.[facility]./2689/Basic-Needs     2  [facility] Distance: 16.12 miles      Phone/Virtual   [address] [place] [address]  Language: English  Hours: Mon - Sun Open 24 Hours   Phone: (651) 525-5986 Email: communications@[facility].org Website: [email or web address]/oursaviourshousing/          Housing       Coordinated Entry access point  3  [facility]  Office - [facility] Distance: 5.86 miles      Phone/Virtual   [address] [place] [address]  Language: English  Hours: Mon - Fri 8:30 AM - 12:00 PM , Mon - Fri 1:00 PM - 4:00 PM  Fees: Free   Phone: (160) 470-6589 Ext.2 Email: kailyn@[facility].org Website: https://www.[facility].org/usn/     4  [facility] Distance: 15.92 miles      In-Person, Phone/Virtual   [address] [place] [address]  Language: English, Nicaraguan  Hours: Mon - Fri 8:30 AM - 4:30 PM  Fees: Free   Phone: (898) 664-1464 Email: info@[facility].org Website: https://www.[facility].org/locations/[facility]-cl[facility]/     Drop-in center or day shelter  5  [facility] Distance: 15.1 miles      In-Person   [address] [place] [address]  Language: English, Hmong, Zambian, Nicaraguan  Hours: Mon - Thu 8:30 AM - 4:30 PM , Sat - Sun 9:00 AM - 12:00 PM  Fees: Free   Phone: (356) 506-5319 Email: info@[employer].org Website:  https://sharingandcaringhands.org/     6  [facility] Distance: 15.28 miles      In-Person   [address] [place] [address]  Language: English  Hours: Mon - Fri 9:00 AM - 4:00 PM  Fees: Free   Phone: (340) 107-8004 Email: loulou@[employer] Website: http://oroeco     Housing search assistance  7  [facility] ([facility] - [facility] Rental Housing Distance: 5.84 miles      In-Person, Phone/Virtual   [address] [place] [address]  Language: English  Hours: Mon - Fri 8:00 AM - 4:30 PM  Fees: Free   Phone: (520) 847-6406 Email: accap@[employer]ap.org Website: http://www.accap.org     8  Neighborhood Assistance [employer] Distance: 11.13 miles      Phone/Virtual   [address] [place] [address]  Language: English, Citizen of [place]  Hours: Mon - Fri 9:00 AM - 5:00 PM  Fees: Free   Phone: (772) 600-6615 Email: services@[employer] Website: https://www.[employer]     Shelter for families  9  [facility] Distance: 7.08 miles      In-Person   [address] [place] [address]  Language: English  Hours: Mon - Fri 3:00 PM - 9:00 AM , Sat - Sun Open 24 Hours  Fees: Free   Phone: (282) 252-4571 Ext.1 Website: https://www.[facility].org/2020/07/03/emergency-family-shelter/     Shelter for individuals  10  [facility] Distance: 15.35 miles      In-Person   [address] [place] [address]  Language: English  Hours: Mon - Fri 4:00 PM - 9:00 AM  Fees: Free   Phone: (851) 376-6871 Email: izzy@[facility].org Website: https://[facility].org/northern/[facility]/     11  [facility] - [facility] [place] [facility] - [facility] [place] [facility] Distance: 15.89 miles      In-Person   [address] [place] [address]  Language: English  Hours: Mon - Sun 5:00 PM - 10:00 AM  Fees: Free, Self Pay    Phone: (933) 154-9089 Email: info@[employer] Website: https://www.[employer].org/locations/[facility]/          Important Numbers & Websites       Emergency Services   911  [facility] Services   311  Poison Control   (485) 762-6526  Suicide Prevention Lifeline   (835) 622-4782 (TALK)  Child Abuse Hotline   (992) 437-6812 (4-A-Child)  Sexual Assault Hotline   (776) 867-1774 (HOPE)  National Runaway Safeline   (224) 923-5097 (RUNAWAY)  All-Options Talkline   (187) 734-4202  Substance Abuse Referral   (289) 724-9338 (HELP)

## 2023-12-06 ENCOUNTER — OFFICE VISIT (OUTPATIENT)
Dept: FAMILY MEDICINE | Facility: CLINIC | Age: 58
End: 2023-12-06
Payer: COMMERCIAL

## 2023-12-06 VITALS
WEIGHT: 194.8 LBS | TEMPERATURE: 97.4 F | DIASTOLIC BLOOD PRESSURE: 78 MMHG | BODY MASS INDEX: 34.52 KG/M2 | RESPIRATION RATE: 14 BRPM | HEIGHT: 63 IN | SYSTOLIC BLOOD PRESSURE: 112 MMHG | HEART RATE: 81 BPM | OXYGEN SATURATION: 96 %

## 2023-12-06 DIAGNOSIS — F32.A ANXIETY AND DEPRESSION: ICD-10-CM

## 2023-12-06 DIAGNOSIS — Z79.899 HIGH RISK MEDICATION USE: ICD-10-CM

## 2023-12-06 DIAGNOSIS — E78.5 HYPERLIPIDEMIA LDL GOAL <100: ICD-10-CM

## 2023-12-06 DIAGNOSIS — E03.9 HYPOTHYROIDISM, UNSPECIFIED TYPE: Primary | ICD-10-CM

## 2023-12-06 DIAGNOSIS — R60.9 EDEMA, UNSPECIFIED TYPE: ICD-10-CM

## 2023-12-06 DIAGNOSIS — F41.9 ANXIETY AND DEPRESSION: ICD-10-CM

## 2023-12-06 DIAGNOSIS — Z12.31 ENCOUNTER FOR SCREENING MAMMOGRAM FOR BREAST CANCER: ICD-10-CM

## 2023-12-06 LAB
ANION GAP SERPL CALCULATED.3IONS-SCNC: 9 MMOL/L (ref 7–15)
BUN SERPL-MCNC: 13.5 MG/DL (ref 8–23)
CALCIUM SERPL-MCNC: 9.4 MG/DL (ref 8.8–10.2)
CHLORIDE SERPL-SCNC: 106 MMOL/L (ref 98–107)
CHOLEST SERPL-MCNC: 208 MG/DL
CREAT SERPL-MCNC: 0.85 MG/DL (ref 0.51–0.95)
DEPRECATED HCO3 PLAS-SCNC: 25 MMOL/L (ref 22–29)
EGFRCR SERPLBLD CKD-EPI 2021: 78 ML/MIN/1.73M2
FASTING STATUS PATIENT QL REPORTED: YES
GLUCOSE SERPL-MCNC: 93 MG/DL (ref 70–99)
HDLC SERPL-MCNC: 45 MG/DL
LDLC SERPL CALC-MCNC: 132 MG/DL
NONHDLC SERPL-MCNC: 163 MG/DL
POTASSIUM SERPL-SCNC: 4.6 MMOL/L (ref 3.4–5.3)
SODIUM SERPL-SCNC: 140 MMOL/L (ref 135–145)
T4 FREE SERPL-MCNC: 0.95 NG/DL (ref 0.9–1.7)
TRIGL SERPL-MCNC: 155 MG/DL
TSH SERPL DL<=0.005 MIU/L-ACNC: 7.47 UIU/ML (ref 0.3–4.2)
VIT D+METAB SERPL-MCNC: 36 NG/ML (ref 20–50)

## 2023-12-06 PROCEDURE — 99214 OFFICE O/P EST MOD 30 MIN: CPT | Mod: 25 | Performed by: NURSE PRACTITIONER

## 2023-12-06 PROCEDURE — 80048 BASIC METABOLIC PNL TOTAL CA: CPT | Performed by: NURSE PRACTITIONER

## 2023-12-06 PROCEDURE — 36415 COLL VENOUS BLD VENIPUNCTURE: CPT | Performed by: NURSE PRACTITIONER

## 2023-12-06 PROCEDURE — 80061 LIPID PANEL: CPT | Performed by: NURSE PRACTITIONER

## 2023-12-06 PROCEDURE — 96381 ADMN RSV MONOC ANTB IM NJX: CPT | Performed by: NURSE PRACTITIONER

## 2023-12-06 PROCEDURE — 84443 ASSAY THYROID STIM HORMONE: CPT | Performed by: NURSE PRACTITIONER

## 2023-12-06 PROCEDURE — 84439 ASSAY OF FREE THYROXINE: CPT | Performed by: NURSE PRACTITIONER

## 2023-12-06 PROCEDURE — 90678 RSV VACC PREF BIVALENT IM: CPT | Performed by: NURSE PRACTITIONER

## 2023-12-06 PROCEDURE — 82306 VITAMIN D 25 HYDROXY: CPT | Performed by: NURSE PRACTITIONER

## 2023-12-06 RX ORDER — LEVOTHYROXINE SODIUM 25 UG/1
25 TABLET ORAL DAILY
Qty: 90 TABLET | Refills: 3 | Status: SHIPPED | OUTPATIENT
Start: 2023-12-06

## 2023-12-06 RX ORDER — FUROSEMIDE 20 MG
TABLET ORAL
Qty: 10 TABLET | Refills: 0 | Status: SHIPPED | OUTPATIENT
Start: 2023-12-06

## 2023-12-06 RX ORDER — CITALOPRAM HYDROBROMIDE 20 MG/1
20 TABLET ORAL DAILY
Qty: 90 TABLET | Refills: 3 | Status: SHIPPED | OUTPATIENT
Start: 2023-12-06

## 2023-12-06 ASSESSMENT — ENCOUNTER SYMPTOMS
DIZZINESS: 0
ABDOMINAL DISTENTION: 0
CONSTIPATION: 0
SORE THROAT: 0
DYSPHORIC MOOD: 0
NUMBNESS: 0
MYALGIAS: 0
COUGH: 0
RHINORRHEA: 0
DIARRHEA: 0
SHORTNESS OF BREATH: 0
WHEEZING: 0
NERVOUS/ANXIOUS: 0
VOMITING: 0
PALPITATIONS: 0
ABDOMINAL PAIN: 0
ARTHRALGIAS: 0
CHEST TIGHTNESS: 0
NAUSEA: 0
SLEEP DISTURBANCE: 0
LIGHT-HEADEDNESS: 0
FATIGUE: 0
HEADACHES: 0

## 2023-12-06 ASSESSMENT — PATIENT HEALTH QUESTIONNAIRE - PHQ9
SUM OF ALL RESPONSES TO PHQ QUESTIONS 1-9: 0
10. IF YOU CHECKED OFF ANY PROBLEMS, HOW DIFFICULT HAVE THESE PROBLEMS MADE IT FOR YOU TO DO YOUR WORK, TAKE CARE OF THINGS AT HOME, OR GET ALONG WITH OTHER PEOPLE: NOT DIFFICULT AT ALL
SUM OF ALL RESPONSES TO PHQ QUESTIONS 1-9: 0

## 2023-12-06 ASSESSMENT — PAIN SCALES - GENERAL: PAINLEVEL: NO PAIN (0)

## 2023-12-06 NOTE — PROGRESS NOTES
"  Assessment & Plan     Hypothyroidism, unspecified type  Taking levothyroxine routinely.  We will recheck TSH here today.  Refill sent.  Follow-up in 1 year  - levothyroxine (SYNTHROID/LEVOTHROID) 25 MCG tablet; Take 1 tablet (25 mcg) by mouth daily Due for follow-up  - TSH with free T4 reflex; Future    Anxiety and depression  Stable-doing well on current.  Refill sent.  Follow-up in 1 year  - citalopram (CELEXA) 20 MG tablet; Take 1 tablet (20 mg) by mouth daily Due for follow-up    Edema, unspecified type  Refill given to have available when needed.  - furosemide (LASIX) 20 MG tablet; Take 1 tablet daily as needed for swelling    Encounter for screening mammogram for breast cancer  Order placed, plans to call per self and set up  - MA Screening Digital Bilateral; Future    Hyperlipidemia LDL goal <100  Previous lipids reviewed.  We will recheck here today.  - Basic metabolic panel; Future  - Lipid panel reflex to direct LDL Fasting; Future    High risk medication use  - Vitamin D Deficiency; Future     We will administer RSV vaccine today in clinic.  Encouraged to go to pharmacy for COVID-19 booster.    BMI:   Estimated body mass index is 34.09 kg/m  as calculated from the following:    Height as of this encounter: 1.61 m (5' 3.39\").    Weight as of this encounter: 88.4 kg (194 lb 12.8 oz).       MEME Geronimo CNP  [facility] RAMSES Sandoval is a 58 year old, presenting for the following health issues:  Refill Request        12/6/2023     8:09 AM   Additional Questions   Roomed by Emi Walker CMA   Accompanied by N/A         12/6/2023     8:09 AM   Patient Reported Additional Medications   Patient reports taking the following new medications No new medications     Patient would like to have a RSV and covid shot, refill on levothyroxine, citalopram.    History of Present Illness       Reason for visit:  Med refill    She eats 2-3 servings of fruits and vegetables daily.She " "consumes 0 sweetened beverage(s) daily. She exercises with enough effort to increase her heart rate 4 days per week.   She is taking medications regularly.       Here today for medication check.  Is fasting today for labs.  Continues to take citalopram for anxiety and depression.  Feels like is doing well on current dose.  No negative side effects.    Has been taking levothyroxine routinely-has not missed any doses.    Needs to get set up for mammogram-does self breast exam without area of concern.      Review of Systems   Constitutional:  Negative for fatigue.   HENT:  Negative for ear pain, rhinorrhea and sore throat.    Eyes:  Negative for visual disturbance.   Respiratory:  Negative for cough, chest tightness, shortness of breath and wheezing.    Cardiovascular:  Negative for chest pain and palpitations.   Gastrointestinal:  Negative for abdominal distention, abdominal pain, constipation, diarrhea, nausea and vomiting.   Endocrine: Negative for cold intolerance and heat intolerance.   Musculoskeletal:  Negative for arthralgias and myalgias.   Skin:  Negative for rash.   Neurological:  Negative for dizziness, light-headedness, numbness and headaches.   Psychiatric/Behavioral:  Negative for dysphoric mood and sleep disturbance. The patient is not nervous/anxious.             Objective    /78   Pulse 81   Temp 97.4  F (36.3  C) (Temporal)   Resp 14   Ht 1.61 m (5' 3.39\")   Wt 88.4 kg (194 lb 12.8 oz)   SpO2 96%   BMI 34.09 kg/m    Body mass index is 34.09 kg/m .  Physical Exam  Constitutional:       Appearance: Normal appearance. She is well-developed.   HENT:      Head: Normocephalic and atraumatic.      Right Ear: Tympanic membrane and external ear normal. No middle ear effusion.      Left Ear: Tympanic membrane and external ear normal.  No middle ear effusion.      Nose: No mucosal edema.   Neck:      Thyroid: No thyromegaly.      Vascular: No carotid bruit.   Cardiovascular:      Rate and Rhythm: " Normal rate and regular rhythm.      Heart sounds: Normal heart sounds.   Pulmonary:      Effort: Pulmonary effort is normal.      Breath sounds: Normal breath sounds.   Abdominal:      General: Bowel sounds are normal.      Palpations: Abdomen is soft.   Skin:     General: Skin is warm and dry.   Neurological:      Mental Status: She is alert.   Psychiatric:         Behavior: Behavior normal.

## 2023-12-06 NOTE — COMMUNITY RESOURCES LIST (ENGLISH)
12/06/2023   [facility]  N/A  For questions about this resource list or additional care needs, please contact your primary care clinic or care manager.  Phone: [phone]   Email: N/A   Address: [address] [place] [address]   Hours: N/A        Hotlines and Helplines       Hotline - Housing crisis  1  [facility] Housing Resource Line Distance: 12.26 miles      Phone/Virtual   [address] [place] [address]  Language: English  Hours: Mon - Sun Open 24 Hours   Phone: (564) 417-9757 Website: https://www.[facility]./2689/Basic-Needs     2  [facility] Distance: 16.12 miles      Phone/Virtual   [address] [place] [address]  Language: English  Hours: Mon - Sun Open 24 Hours   Phone: (786) 703-1869 Email: communications@\[facility]\""-[place].org Website: https://\[facility]\""-[place].org/oursaviourshousing/          Housing       Coordinated Entry access point  3  [facility]  Office - [facility] Distance: 5.86 miles      Phone/Virtual   [address] [place] [address]  Language: English  Hours: Mon - Fri 8:30 AM - 12:00 PM , Mon - Fri 1:00 PM - 4:00 PM  Fees: Free   Phone: (758) 217-5011 Ext.2 Email: kailyn@[facility] [employer] [facility].org Website: https://www.Best Learning EnglishationManhattan Labsusa.org/usn/     4  [facility] Distance: 15.9 miles      In-Person, Phone/Virtual   [address] [place] [address]  Language: English  Hours: Mon - Fri 8:30 AM - 4:30 PM  Fees: Free   Phone: (787) 450-8980 Email: info@mncare.org Website: https://www.[facility].org/locations/[facility]-clinic/     Drop-in center or day shelter  5  [facility] Distance: 15.1 miles      In-Person   [address] [place] [address]  Language: English, Hmong, Polish, Cymraes  Hours: Mon - Thu 8:30 AM - 4:30 PM , Sat - Sun 9:00 AM - 12:00 PM  Fees: Free   Phone: (304) 721-2269 Email: info@[employer].org Website:  https://sharingandcaringhands.org/     6  Listening [facility] Distance: 15.28 miles      In-Person   [address] [place] [address]  Language: English  Hours: Mon - Fri 9:00 AM - 4:00 PM  Fees: Free   Phone: (192) 200-7700 Email: loulou@[employer].com Website: http://"[employer]     Housing search assistance  7  [facility] ([facility]) - [facility] Rental Housing Distance: 5.84 miles      In-Person, Phone/Virtual   [address] [place] [address]  Language: English  Hours: Mon - Fri 8:00 AM - 4:30 PM  Fees: Free   Phone: (710) 997-1148 Email: accap@accap.org Website: http://www.accap.org     8  Neighborhood Assistance [employer] Distance: 11.13 miles      Phone/Virtual   [address] [place] [address]  Language: English, German  Hours: Mon - Fri 9:00 AM - 5:00 PM  Fees: Free   Phone: (124) 352-7302 Email: services@[employer] Website: https://www.[employer]     Shelter for families  9  [facility] Distance: 7.08 miles      In-Person   [address] [place] [address]  Language: English  Hours: Mon - Fri 3:00 PM - 9:00 AM , Sat - Sun Open 24 Hours  Fees: Free   Phone: (203) 643-7848 Ext.1 Website: https://www.[facility].org/2020/07/03/emergency-family-shelter/     Shelter for individuals  10  [facility] Distance: 15.35 miles      In-Person   [address] [place] [address]  Language: English  Hours: Mon - Fri 4:00 PM - 9:00 AM  Fees: Free   Phone: (648) 327-5361 Email: izzy@[facility].org Website: https://[facility].org/northern/[facility]/     11  [facility] - [facility] [place] [facility] - [facility] [place] [facility] Distance: 15.89 miles      In-Person   [address] [place] [address]  Language: English  Hours: Mon - Sun 5:00 PM - 10:00 AM  Fees: Free, Self Pay    Phone: (330) 999-3471 Email: info@[employer] Website: https://www.[employer].org/locations/[facility]-[place]/          Important Numbers & Websites       Emergency Services   911  [facility] Services   311  Poison Control   (968) 449-1483  Suicide Prevention Lifeline   (566) 366-9189 (TALK)  Child Abuse Hotline   (881) 910-3765 (4-A-Child)  Sexual Assault Hotline   (663) 626-1939 (HOPE)  National Runaway Safeline   (108) 431-3833 (RUNAWAY)  All-Options Talkline   (134) 587-2955  Substance Abuse Referral   (243) 148-8558 (HELP)

## 2023-12-06 NOTE — PATIENT INSTRUCTIONS
Please go to the pharmacy in 2-3 weeks for your COVID booster    You can stop taking the over the counter aspirin.     You can call imaging scheduling to set up appointment date, time, and location that works best for you to have mammogram [phone]

## 2023-12-06 NOTE — COMMUNITY RESOURCES LIST (ENGLISH)
12/06/2023   [facility]  N/A  For questions about this resource list or additional care needs, please contact your primary care clinic or care manager.  Phone: [phone]   Email: N/A   Address: [address] [place] [address]   Hours: N/A        Hotlines and Helplines       Hotline - Housing crisis  1  [facility] Housing Resource Line Distance: 12.26 miles      Phone/Virtual   [address] [place] [address]  Language: English  Hours: Mon - Sun Open 24 Hours   Phone: (937) 206-4564 Website: https://www.[facility]./2689/Basic-Needs     2  [facility] Distance: 16.12 miles      Phone/Virtual   [address] [place] [address]  Language: English  Hours: Mon - Sun Open 24 Hours   Phone: (791) 588-1350 Email: communications@[facility]-mn.org Website: https://[facility]-mn.org/oursaviourshousing/          Housing       Coordinated Entry access point  3  [facility]  Office - [facility] Distance: 5.86 miles      Phone/Virtual   [address] [place] [address]  Language: English  Hours: Mon - Fri 8:30 AM - 12:00 PM , Mon - Fri 1:00 PM - 4:00 PM  Fees: Free   Phone: (890) 191-7480 Ext.2 Email: kailyn@[facility]ocereSaint Francis HealthcareOSOYOU.com.org Website: https://www.ZocereationOSOYOU.comusa.org/usn/     4  [facility] Distance: 15.9 miles      In-Person, Phone/Virtual   [address] [place] [address]  Language: English  Hours: Mon - Fri 8:30 AM - 4:30 PM  Fees: Free   Phone: (392) 986-3405 Email: info@mncare.org Website: https://www.[facility].org/locations/[facility]-clinic/     Drop-in center or day shelter  5  [facility] Distance: 15.1 miles      In-Person   [address] [place] [address]  Language: English, Hmong, Maltese, Cymro  Hours: Mon - Thu 8:30 AM - 4:30 PM , Sat - Sun 9:00 AM - 12:00 PM  Fees: Free   Phone: (152) 199-3444 Email: info@[employer]tr[employer].org Website:  https://sharingandcaringhands.org/     6  Listening [facility] Distance: 15.28 miles      In-Person   [address] [place] [address]  Language: English  Hours: Mon - Fri 9:00 AM - 4:00 PM  Fees: Free   Phone: (143) 547-3402 Email: loulou@[employer] Website: http://[employer]     Housing search assistance  7  [facility] [employer] [facility] ([facility]) - [facility] Rental Housing Distance: 5.84 miles      In-Person, Phone/Virtual   [address] [place] [address]  Language: English  Hours: Mon - Fri 8:00 AM - 4:30 PM  Fees: Free   Phone: (738) 686-3395 Email: accap@accap.org Website: http://www.accap.org     8  Neighborhood Assistance [employer] Distance: 11.13 miles      Phone/Virtual   [address] [place] [address]  Language: English, [place]inean  Hours: Mon - Fri 9:00 AM - 5:00 PM  Fees: Free   Phone: (124) 402-3514 Email: services@[employer] Website: https://www.Netlift     Shelter for families  9  [facility] Distance: 7.08 miles      In-Person   [address] [place] [address]  Language: English  Hours: Mon - Fri 3:00 PM - 9:00 AM , Sat - Sun Open 24 Hours  Fees: Free   Phone: (770) 338-8929 Ext.1 Website: https://www.saintandrews.org/2020/07/03/emergency-family-shelter/     Shelter for individuals  10  [facility] Distance: 15.35 miles      In-Person   [address] [place] [address]  Language: English  Hours: Mon - Fri 4:00 PM - 9:00 AM  Fees: Free   Phone: (870) 326-4593 Email: izzy@[facility].org Website: https://[facility].org/northern/[facility]/     11  [facility] [place] - [facility] [place] [facility] - [facility] [place] [facility] Distance: 15.89 miles      In-Person   [address] [place] [address]  Language: English  Hours: Mon - Sun 5:00 PM - 10:00 AM  Fees: Free, Self Pay    Phone: (803) 632-9839 Email: info@[employer] Website: https://www.[employer].org/locations/[facility]/          Important Numbers & Websites       Emergency Services   911  [facility] Services   311  Poison Control   (971) 154-2847  Suicide Prevention Lifeline   (130) 934-6586 (TALK)  Child Abuse Hotline   (736) 751-7994 (4-A-Child)  Sexual Assault Hotline   (345) 540-2274 (HOPE)  National Runaway Safeline   (667) 197-7847 (RUNAWAY)  All-Options Talkline   (361) 460-4240  Substance Abuse Referral   (144) 504-5686 (HELP)

## 2024-02-07 ENCOUNTER — ANCILLARY PROCEDURE (OUTPATIENT)
Dept: MAMMOGRAPHY | Facility: CLINIC | Age: 59
End: 2024-02-07
Attending: NURSE PRACTITIONER
Payer: COMMERCIAL

## 2024-02-07 DIAGNOSIS — Z12.31 ENCOUNTER FOR SCREENING MAMMOGRAM FOR BREAST CANCER: ICD-10-CM

## 2024-02-07 PROCEDURE — 77063 BREAST TOMOSYNTHESIS BI: CPT

## 2024-08-04 ENCOUNTER — HEALTH MAINTENANCE LETTER (OUTPATIENT)
Age: 59
End: 2024-08-04

## 2024-12-11 ENCOUNTER — OFFICE VISIT (OUTPATIENT)
Dept: FAMILY MEDICINE | Facility: CLINIC | Age: 59
End: 2024-12-11
Payer: COMMERCIAL

## 2024-12-11 VITALS
WEIGHT: 193 LBS | BODY MASS INDEX: 34.2 KG/M2 | HEIGHT: 63 IN | HEART RATE: 77 BPM | SYSTOLIC BLOOD PRESSURE: 112 MMHG | DIASTOLIC BLOOD PRESSURE: 68 MMHG | RESPIRATION RATE: 16 BRPM | OXYGEN SATURATION: 99 % | TEMPERATURE: 97.8 F

## 2024-12-11 DIAGNOSIS — E03.9 HYPOTHYROIDISM, UNSPECIFIED TYPE: ICD-10-CM

## 2024-12-11 DIAGNOSIS — Z12.11 SCREEN FOR COLON CANCER: ICD-10-CM

## 2024-12-11 DIAGNOSIS — Z78.0 POST-MENOPAUSAL: ICD-10-CM

## 2024-12-11 DIAGNOSIS — Z00.00 ROUTINE GENERAL MEDICAL EXAMINATION AT A HEALTH CARE FACILITY: ICD-10-CM

## 2024-12-11 DIAGNOSIS — F32.A ANXIETY AND DEPRESSION: ICD-10-CM

## 2024-12-11 DIAGNOSIS — Z00.00 WELL ADULT EXAM: Primary | ICD-10-CM

## 2024-12-11 DIAGNOSIS — Z12.31 ENCOUNTER FOR SCREENING MAMMOGRAM FOR BREAST CANCER: ICD-10-CM

## 2024-12-11 DIAGNOSIS — F41.9 ANXIETY AND DEPRESSION: ICD-10-CM

## 2024-12-11 LAB
CHOLEST SERPL-MCNC: 207 MG/DL
EST. AVERAGE GLUCOSE BLD GHB EST-MCNC: 103 MG/DL
FASTING STATUS PATIENT QL REPORTED: YES
HBA1C MFR BLD: 5.2 % (ref 0–5.6)
HDLC SERPL-MCNC: 48 MG/DL
LDLC SERPL CALC-MCNC: 126 MG/DL
NONHDLC SERPL-MCNC: 159 MG/DL
TRIGL SERPL-MCNC: 163 MG/DL
TSH SERPL DL<=0.005 MIU/L-ACNC: 5.54 UIU/ML (ref 0.3–4.2)
VIT D+METAB SERPL-MCNC: 41 NG/ML (ref 20–50)

## 2024-12-11 PROCEDURE — 99396 PREV VISIT EST AGE 40-64: CPT | Performed by: NURSE PRACTITIONER

## 2024-12-11 PROCEDURE — 82306 VITAMIN D 25 HYDROXY: CPT | Performed by: NURSE PRACTITIONER

## 2024-12-11 PROCEDURE — 36415 COLL VENOUS BLD VENIPUNCTURE: CPT | Performed by: NURSE PRACTITIONER

## 2024-12-11 PROCEDURE — 84443 ASSAY THYROID STIM HORMONE: CPT | Performed by: NURSE PRACTITIONER

## 2024-12-11 PROCEDURE — 80061 LIPID PANEL: CPT | Performed by: NURSE PRACTITIONER

## 2024-12-11 PROCEDURE — 83036 HEMOGLOBIN GLYCOSYLATED A1C: CPT | Performed by: NURSE PRACTITIONER

## 2024-12-11 PROCEDURE — 99214 OFFICE O/P EST MOD 30 MIN: CPT | Mod: 25 | Performed by: NURSE PRACTITIONER

## 2024-12-11 RX ORDER — CITALOPRAM HYDROBROMIDE 10 MG/1
10 TABLET ORAL DAILY
Qty: 90 TABLET | Refills: 0 | Status: SHIPPED | OUTPATIENT
Start: 2024-12-11

## 2024-12-11 RX ORDER — LEVOTHYROXINE SODIUM 50 UG/1
50 TABLET ORAL DAILY
Qty: 90 TABLET | Refills: 0 | Status: SHIPPED | OUTPATIENT
Start: 2024-12-11

## 2024-12-11 RX ORDER — LEVOTHYROXINE SODIUM 25 UG/1
25 TABLET ORAL DAILY
Qty: 90 TABLET | Refills: 3 | Status: SHIPPED | OUTPATIENT
Start: 2024-12-11 | End: 2024-12-11

## 2024-12-11 RX ORDER — CITALOPRAM HYDROBROMIDE 20 MG/1
20 TABLET ORAL DAILY
Qty: 90 TABLET | Refills: 3 | Status: SHIPPED | OUTPATIENT
Start: 2024-12-11

## 2024-12-11 SDOH — HEALTH STABILITY: PHYSICAL HEALTH: ON AVERAGE, HOW MANY DAYS PER WEEK DO YOU ENGAGE IN MODERATE TO STRENUOUS EXERCISE (LIKE A BRISK WALK)?: 2 DAYS

## 2024-12-11 ASSESSMENT — ANXIETY QUESTIONNAIRES
3. WORRYING TOO MUCH ABOUT DIFFERENT THINGS: NOT AT ALL
7. FEELING AFRAID AS IF SOMETHING AWFUL MIGHT HAPPEN: SEVERAL DAYS
GAD7 TOTAL SCORE: 2
IF YOU CHECKED OFF ANY PROBLEMS ON THIS QUESTIONNAIRE, HOW DIFFICULT HAVE THESE PROBLEMS MADE IT FOR YOU TO DO YOUR WORK, TAKE CARE OF THINGS AT HOME, OR GET ALONG WITH OTHER PEOPLE: NOT DIFFICULT AT ALL
8. IF YOU CHECKED OFF ANY PROBLEMS, HOW DIFFICULT HAVE THESE MADE IT FOR YOU TO DO YOUR WORK, TAKE CARE OF THINGS AT HOME, OR GET ALONG WITH OTHER PEOPLE?: NOT DIFFICULT AT ALL
7. FEELING AFRAID AS IF SOMETHING AWFUL MIGHT HAPPEN: SEVERAL DAYS
5. BEING SO RESTLESS THAT IT IS HARD TO SIT STILL: NOT AT ALL
GAD7 TOTAL SCORE: 2
4. TROUBLE RELAXING: NOT AT ALL
2. NOT BEING ABLE TO STOP OR CONTROL WORRYING: NOT AT ALL
6. BECOMING EASILY ANNOYED OR IRRITABLE: NOT AT ALL
GAD7 TOTAL SCORE: 2
1. FEELING NERVOUS, ANXIOUS, OR ON EDGE: SEVERAL DAYS

## 2024-12-11 ASSESSMENT — PATIENT HEALTH QUESTIONNAIRE - PHQ9
SUM OF ALL RESPONSES TO PHQ QUESTIONS 1-9: 0
SUM OF ALL RESPONSES TO PHQ QUESTIONS 1-9: 0
10. IF YOU CHECKED OFF ANY PROBLEMS, HOW DIFFICULT HAVE THESE PROBLEMS MADE IT FOR YOU TO DO YOUR WORK, TAKE CARE OF THINGS AT HOME, OR GET ALONG WITH OTHER PEOPLE: NOT DIFFICULT AT ALL

## 2024-12-11 ASSESSMENT — SOCIAL DETERMINANTS OF HEALTH (SDOH): HOW OFTEN DO YOU GET TOGETHER WITH FRIENDS OR RELATIVES?: ONCE A WEEK

## 2024-12-11 NOTE — PROGRESS NOTES
"Preventive Care Visit  [facility] MEME Crowell CNP, Family Medicine  Dec 11, 2024      Assessment & Plan     Anxiety and depression  Will increase dose of citalopram from 20 mg to 30 mg daily.  Plan follow-up appointment in 6 weeks-can be virtual  - citalopram (CELEXA) 20 MG tablet; Take 1 tablet (20 mg) by mouth daily. Take with 10 mg tablet to equal 30 mg/day  - citalopram (CELEXA) 10 MG tablet; Take 1 tablet (10 mg) by mouth daily. Take with 20 mg tablet to equal 30 total milligrams per day    Hypothyroidism, unspecified type  Taking routinely.  Will recheck labs here today.  - Vitamin D Deficiency; Future  - TSH; Future  - levothyroxine (SYNTHROID/LEVOTHROID) 25 MCG tablet; Take 1 tablet (25 mcg) by mouth daily. Due for follow-up  - Vitamin D Deficiency  - TSH    Screen for colon cancer  - Colonoscopy Screening  Referral; Future    Post-menopausal  Order placed, plans to set up per self  - DX Bone Density; Future  - calcium carbonate-vitamin D (OSCAL) 500-5 MG-MCG tablet; Take 1 tablet by mouth 2 times daily.    Encounter for screening mammogram for breast cancer  Order placed, plans to set up per self  - MA Screening Bilateral w/ Gurinder; Future    Well adult exam  Screening guidelines reviewed.   - Hemoglobin A1c; Future  - Lipid panel reflex to direct LDL Fasting; Future  - Hemoglobin A1c  - Lipid panel reflex to direct LDL Fasting        BMI  Estimated body mass index is 34.2 kg/m  as calculated from the following:    Height as of this encounter: 1.6 m (5' 2.99\").    Weight as of this encounter: 87.5 kg (193 lb).         Subjective   Lori is a 59 year old, presenting for the following:  Recheck Medication        12/11/2024     8:10 AM   Additional Questions   Roomed by Jessica   Accompanied by Iker         12/11/2024     8:10 AM   Patient Reported Additional Medications   Patient reports taking the following new medications NAZARIO DRAPER    Patient with history of " Hypothyroidism, Depression and Anxiety arrived for Medication follow-up. GAd7 and PHQ9 completed online.     Hypothyroidism Follow-up    Since last visit, patient describes the following symptoms: Weight stable, no hair loss, no skin changes, no constipation, no loose stools    Depression and Anxiety   How are you doing with your depression since your last visit? No change  How are you doing with your anxiety since your last visit?  No change  Are you having other symptoms that might be associated with depression or anxiety? No  Have you had a significant life event? No   Do you have any concerns with your use of alcohol or other drugs? No    Social History     Tobacco Use    Smoking status: Former     Types: Cigarettes     Passive exposure: Past    Smokeless tobacco: Never   Vaping Use    Vaping status: Never Used   Substance Use Topics    Alcohol use: Yes     Comment: rare    Drug use: Never         12/14/2022     7:37 AM 12/6/2023     7:54 AM 12/11/2024     8:01 AM   PHQ   PHQ-9 Total Score 0 0 0    Q9: Thoughts of better off dead/self-harm past 2 weeks Not at all  Not at all  Not at all        Patient-reported         1/27/2020    12:02 PM 1/18/2021     8:46 AM   MARISSA-7 SCORE   Total Score 4 0       Suicide Assessment Five-step Evaluation and Treatment (SAFE-T)        Health Care Directive  Patient has a Health Care Directive on file  Advance care planning document is on file and is current.      12/11/2024   General Health   How would you rate your overall physical health? (!) FAIR   Feel stress (tense, anxious, or unable to sleep) Only a little      (!) STRESS CONCERN      12/11/2024   Nutrition   Three or more servings of calcium each day? Yes   Diet: Regular (no restrictions)   How many servings of fruit and vegetables per day? (!) 2-3   How many sweetened beverages each day? 0-1            12/11/2024   Exercise   Days per week of moderate/strenous exercise 2 days      (!) EXERCISE CONCERN      12/11/2024    Social Factors   Frequency of gathering with friends or relatives Once a week   Worry food won't last until get money to buy more No   Food not last or not have enough money for food? No   Do you have housing? (Housing is defined as stable permanent housing and does not include staying ouside in a car, in a tent, in an abandoned building, in an overnight shelter, or couch-surfing.) Yes   Are you worried about losing your housing? No   Lack of transportation? No   Unable to get utilities (heat,electricity)? No            12/11/2024   Fall Risk   Fallen 2 or more times in the past year? No    Trouble with walking or balance? No        Patient-reported          12/11/2024   Dental   Dentist two times every year? Yes            12/11/2024   TB Screening   Were you born outside of the [place]? No          Today's PHQ-9 Score:       12/11/2024     8:01 AM   PHQ-9 SCORE   PHQ-9 Total Score MyChart 0   PHQ-9 Total Score 0        Patient-reported         12/11/2024   Substance Use   Alcohol more than 3/day or more than 7/wk No   Do you use any other substances recreationally? No        Social History     Tobacco Use    Smoking status: Former     Types: Cigarettes     Passive exposure: Past    Smokeless tobacco: Never   Vaping Use    Vaping status: Never Used   Substance Use Topics    Alcohol use: Yes     Comment: rare    Drug use: Never           2/7/2024   LAST FHS-7 RESULTS   1st degree relative breast or ovarian cancer No   Any relative bilateral breast cancer No   Any male have breast cancer No   Any ONE woman have BOTH breast AND ovarian cancer No   Any woman with breast cancer before 50yrs No   2 or more relatives with breast AND/OR ovarian cancer No   2 or more relatives with breast AND/OR bowel cancer No                 12/11/2024   STI Screening   New sexual partner(s) since last STI/HIV test? No        History of abnormal Pap smear:        ASCVD Risk   The 10-year ASCVD risk score (Brennen HELTON, et al., 2019)  "is: 3.3%    Values used to calculate the score:      Age: 59 years      Sex: Female      Is Non- : No      Diabetic: No      Tobacco smoker: No      Systolic Blood Pressure: 112 mmHg      Is BP treated: No      HDL Cholesterol: 45 mg/dL      Total Cholesterol: 208 mg/dL         Reviewed and updated as needed this visit by Provider                      Here today for physical and medication check.  Is fasting for labs.    Has been taking thyroid medication routinely.    Taking citalopram for anxiety.  Continues to feel very anxious. Worried and scared all the time. Anxious. Very emotional. Does not like change.  Would like to increase dose of citalopram.    Last Pap: Hyst due to endomitosis.   Last mammogram: 2023-no famly history   Last BMD: 2010  Last Colonoscopy: 2020-normal. Repeat in 5 years.  Last eye exam: 1 year ago  Last dental exam: every 6 mo  Last tetanus vaccine: 2020   Last influenza vaccine:October 2024  Last shingles vaccine: Has had both doses 2020 done  Last pneumonia vaccine: N/A  Last RSV vaccine: December 23  Last COVID booster: declines   Hep C screen: 2021  HIV screen: Declines, low risk   AAA screen (age 65-78 with smoking hx): N/A  IVD (HTN, Hyperlipid, Smoking): N/A  Lung CA screening (50-77, 20 pk smoking hx) Quit within 15 years: previous smoker         Objective    Exam  /68 (BP Location: Left arm, Patient Position: Chair, Cuff Size: Adult Regular)   Pulse 77   Temp 97.8  F (36.6  C) (Tympanic)   Resp 16   Ht 1.6 m (5' 2.99\")   Wt 87.5 kg (193 lb)   SpO2 99%   BMI 34.20 kg/m     Estimated body mass index is 34.2 kg/m  as calculated from the following:    Height as of this encounter: 1.6 m (5' 2.99\").    Weight as of this encounter: 87.5 kg (193 lb).    Physical Exam  Constitutional:       Appearance: Normal appearance. She is well-developed.   HENT:      Head: Normocephalic and atraumatic.      Right Ear: Tympanic membrane and external ear normal. No " middle ear effusion.      Left Ear: Tympanic membrane and external ear normal.  No middle ear effusion.      Nose: No mucosal edema.   Neck:      Thyroid: No thyromegaly.      Vascular: No carotid bruit.   Cardiovascular:      Rate and Rhythm: Normal rate and regular rhythm.      Heart sounds: Normal heart sounds.   Pulmonary:      Effort: Pulmonary effort is normal.      Breath sounds: Normal breath sounds.   Abdominal:      General: Bowel sounds are normal.      Palpations: Abdomen is soft.   Skin:     General: Skin is warm and dry.   Neurological:      Mental Status: She is alert.   Psychiatric:         Mood and Affect: Affect is tearful.         Behavior: Behavior normal.         Signed Electronically by: MEME Geronimo CNP

## 2024-12-11 NOTE — PATIENT INSTRUCTIONS
You can call imaging scheduling to set up appointment date, time, and location that works best for you to have bone density test and mammogram [phone]    Patient Education   Preventive Care Advice   This is general advice given by our system to help you stay healthy. However, your care team may have specific advice just for you. Please talk to your care team about your preventive care needs.  Nutrition  Eat 5 or more servings of fruits and vegetables each day.  Try wheat bread, brown rice and whole grain pasta (instead of white bread, rice, and pasta).  Get enough calcium and vitamin D. Check the label on foods and aim for 100% of the RDA (recommended daily allowance).  Lifestyle  Exercise at least 150 minutes each week  (30 minutes a day, 5 days a week).  Do muscle strengthening activities 2 days a week. These help control your weight and prevent disease.  No smoking.  Wear sunscreen to prevent skin cancer.  Have a dental exam and cleaning every 6 months.  Yearly exams  See your health care team every year to talk about:  Any changes in your health.  Any medicines your care team has prescribed.  Preventive care, family planning, and ways to prevent chronic diseases.  Shots (vaccines)   HPV shots (up to age 26), if you've never had them before.  Hepatitis B shots (up to age 59), if you've never had them before.  COVID-19 shot: Get this shot when it's due.  Flu shot: Get a flu shot every year.  Tetanus shot: Get a tetanus shot every 10 years.  Pneumococcal, hepatitis A, and RSV shots: Ask your care team if you need these based on your risk.  Shingles shot (for age 50 and up)  General health tests  Diabetes screening:  Starting at age 35, Get screened for diabetes at least every 3 years.  If you are younger than age 35, ask your care team if you should be screened for diabetes.  Cholesterol test: At age 39, start having a cholesterol test every 5 years, or more often if advised.  Bone density scan (DEXA): At age  50, ask your care team if you should have this scan for osteoporosis (brittle bones).  Hepatitis C: Get tested at least once in your life.  STIs (sexually transmitted infections)  Before age 24: Ask your care team if you should be screened for STIs.  After age 24: Get screened for STIs if you're at risk. You are at risk for STIs (including HIV) if:  You are sexually active with more than one person.  You don't use condoms every time.  You or a partner was diagnosed with a sexually transmitted infection.  If you are at risk for HIV, ask about PrEP medicine to prevent HIV.  Get tested for HIV at least once in your life, whether you are at risk for HIV or not.  Cancer screening tests  Cervical cancer screening: If you have a cervix, begin getting regular cervical cancer screening tests starting at age 21.  Breast cancer scan (mammogram): If you've ever had breasts, begin having regular mammograms starting at age 40. This is a scan to check for breast cancer.  Colon cancer screening: It is important to start screening for colon cancer at age 45.  Have a colonoscopy test every 10 years (or more often if you're at risk) Or, ask your provider about stool tests like a FIT test every year or Cologuard test every 3 years.  To learn more about your testing options, visit:   .  For help making a decision, visit:   https://bit.ly/vt22133.  Prostate cancer screening test: If you have a prostate, ask your care team if a prostate cancer screening test (PSA) at age 55 is right for you.  Lung cancer screening: If you are a current or former smoker ages 50 to 80, ask your care team if ongoing lung cancer screenings are right for you.  For informational purposes only. Not to replace the advice of your health care provider. Copyright   2023 [employer]. All rights reserved. Clinically reviewed by the [facility]. [place] [id number] - REV 01/24.

## 2024-12-12 ENCOUNTER — PATIENT OUTREACH (OUTPATIENT)
Dept: CARE COORDINATION | Facility: CLINIC | Age: 59
End: 2024-12-12
Payer: COMMERCIAL

## 2024-12-18 ENCOUNTER — TELEPHONE (OUTPATIENT)
Dept: FAMILY MEDICINE | Facility: CLINIC | Age: 59
End: 2024-12-18

## 2024-12-18 NOTE — TELEPHONE ENCOUNTER
Patient calling regarding the new prescription for 50mcg levothyroxine, she assumes its due to her labs but there is no result note so she is not sure. She is wanting to know how soon she would need her labs rechecked after increasing to the 50mcgs?    Thank you,  Mariah Topete RN

## 2025-01-17 ASSESSMENT — ANXIETY QUESTIONNAIRES
4. TROUBLE RELAXING: NOT AT ALL
8. IF YOU CHECKED OFF ANY PROBLEMS, HOW DIFFICULT HAVE THESE MADE IT FOR YOU TO DO YOUR WORK, TAKE CARE OF THINGS AT HOME, OR GET ALONG WITH OTHER PEOPLE?: NOT DIFFICULT AT ALL
IF YOU CHECKED OFF ANY PROBLEMS ON THIS QUESTIONNAIRE, HOW DIFFICULT HAVE THESE PROBLEMS MADE IT FOR YOU TO DO YOUR WORK, TAKE CARE OF THINGS AT HOME, OR GET ALONG WITH OTHER PEOPLE: NOT DIFFICULT AT ALL
1. FEELING NERVOUS, ANXIOUS, OR ON EDGE: NOT AT ALL
3. WORRYING TOO MUCH ABOUT DIFFERENT THINGS: SEVERAL DAYS
7. FEELING AFRAID AS IF SOMETHING AWFUL MIGHT HAPPEN: MORE THAN HALF THE DAYS
2. NOT BEING ABLE TO STOP OR CONTROL WORRYING: SEVERAL DAYS
7. FEELING AFRAID AS IF SOMETHING AWFUL MIGHT HAPPEN: MORE THAN HALF THE DAYS
6. BECOMING EASILY ANNOYED OR IRRITABLE: NOT AT ALL
GAD7 TOTAL SCORE: 4
GAD7 TOTAL SCORE: 4
5. BEING SO RESTLESS THAT IT IS HARD TO SIT STILL: NOT AT ALL
GAD7 TOTAL SCORE: 4

## 2025-01-22 ENCOUNTER — LAB (OUTPATIENT)
Dept: LAB | Facility: CLINIC | Age: 60
End: 2025-01-22
Payer: COMMERCIAL

## 2025-01-22 ENCOUNTER — VIRTUAL VISIT (OUTPATIENT)
Dept: FAMILY MEDICINE | Facility: CLINIC | Age: 60
End: 2025-01-22
Payer: COMMERCIAL

## 2025-01-22 DIAGNOSIS — E03.9 HYPOTHYROIDISM, UNSPECIFIED TYPE: ICD-10-CM

## 2025-01-22 DIAGNOSIS — F32.A ANXIETY AND DEPRESSION: ICD-10-CM

## 2025-01-22 DIAGNOSIS — F41.9 ANXIETY AND DEPRESSION: ICD-10-CM

## 2025-01-22 PROCEDURE — 98005 SYNCH AUDIO-VIDEO EST LOW 20: CPT | Performed by: NURSE PRACTITIONER

## 2025-01-22 PROCEDURE — 84443 ASSAY THYROID STIM HORMONE: CPT

## 2025-01-22 PROCEDURE — 36415 COLL VENOUS BLD VENIPUNCTURE: CPT

## 2025-01-22 RX ORDER — CITALOPRAM HYDROBROMIDE 10 MG/1
10 TABLET ORAL DAILY
Qty: 90 TABLET | Refills: 2 | Status: SHIPPED | OUTPATIENT
Start: 2025-01-22

## 2025-01-22 ASSESSMENT — ENCOUNTER SYMPTOMS: NERVOUS/ANXIOUS: 1

## 2025-01-22 NOTE — PROGRESS NOTES
"Lori is a 59 year old who is being evaluated via a billable video visit.    How would you like to obtain your AVS? MyChart  If the video visit is dropped, the invitation should be resent by: Text to cell phone: [phone]  Will anyone else be joining your video visit? No      Assessment & Plan     Anxiety and depression  Improved since increasing dosage of citalopram.  Refill sent.  Plan to follow-up at annual exam.  - citalopram (CELEXA) 10 MG tablet; Take 1 tablet (10 mg) by mouth daily. Take with 20 mg tablet to equal 30 total milligrams per day          BMI  Estimated body mass index is 34.2 kg/m  as calculated from the following:    Height as of 12/11/24: 1.6 m (5' 2.99\").    Weight as of 12/11/24: 87.5 kg (193 lb).         Subjective   Lori is a 59 year old, presenting for the following health issues:  Anxiety        1/22/2025     6:39 AM   Additional Questions   Roomed by Patient completed intake prior to visit   Accompanied by n/a         1/22/2025     6:39 AM   Patient Reported Additional Medications   Patient reports taking the following new medications n/a     History of Present Illness       Mental Health Follow-up:  Patient presents to follow-up on Depression & Anxiety.Patient's depression since last visit has been:  Good  The patient is not having other symptoms associated with depression.  Patient's anxiety since last visit has been:  Better  The patient is not having other symptoms associated with anxiety.  Any significant life events: No  Patient is not feeling anxious or having panic attacks.  Patient has no concerns about alcohol or drug use.    She eats 2-3 servings of fruits and vegetables daily.She consumes 0 sweetened beverage(s) daily.She exercises with enough effort to increase her heart rate 30 to 60 minutes per day.  She exercises with enough effort to increase her heart rate 3 or less days per week.   She is taking medications regularly.     Video visit for recheck of anxiety after " increasing dose of citalopram to 30 mg daily.  Does feel like anxiety is under much better control.  Has not noticed any negative side effects from medication increase.        Objective           Vitals:  No vitals were obtained today due to virtual visit.    Physical Exam  Constitutional:       Appearance: Normal appearance.   HENT:      Nose: No congestion.   Eyes:      General: Lids are normal.   Pulmonary:      Effort: No tachypnea, bradypnea or respiratory distress.   Skin:     Coloration: Skin is not ashen, cyanotic, jaundiced or pale.   Neurological:      Mental Status: She is alert.   Psychiatric:         Mood and Affect: Mood normal.         Speech: Speech normal.         Behavior: Behavior normal.              Video-Visit Details    Type of service:  Video Visit   Originating Location (pt. Location): Home    Distant Location (provider location):  On-site  Platform used for Video Visit: Igor  Signed Electronically by: MEME Geronimo CNP

## 2025-01-23 DIAGNOSIS — E03.9 HYPOTHYROIDISM, UNSPECIFIED TYPE: Primary | ICD-10-CM

## 2025-01-23 LAB — TSH SERPL DL<=0.005 MIU/L-ACNC: 5.3 UIU/ML (ref 0.3–4.2)

## 2025-01-23 RX ORDER — LEVOTHYROXINE SODIUM 75 UG/1
75 TABLET ORAL
Qty: 30 TABLET | Refills: 1 | Status: SHIPPED | OUTPATIENT
Start: 2025-01-23

## 2025-02-10 ENCOUNTER — ANCILLARY PROCEDURE (OUTPATIENT)
Dept: MAMMOGRAPHY | Facility: CLINIC | Age: 60
End: 2025-02-10
Attending: NURSE PRACTITIONER
Payer: COMMERCIAL

## 2025-02-10 DIAGNOSIS — Z12.31 ENCOUNTER FOR SCREENING MAMMOGRAM FOR BREAST CANCER: ICD-10-CM

## 2025-02-10 PROCEDURE — 77063 BREAST TOMOSYNTHESIS BI: CPT

## 2025-02-10 PROCEDURE — 77067 SCR MAMMO BI INCL CAD: CPT

## 2025-03-19 ENCOUNTER — LAB (OUTPATIENT)
Dept: LAB | Facility: CLINIC | Age: 60
End: 2025-03-19
Payer: COMMERCIAL

## 2025-03-19 DIAGNOSIS — E03.9 HYPOTHYROIDISM, UNSPECIFIED TYPE: ICD-10-CM

## 2025-03-19 PROCEDURE — 36415 COLL VENOUS BLD VENIPUNCTURE: CPT

## 2025-03-20 DIAGNOSIS — E03.9 HYPOTHYROIDISM, UNSPECIFIED TYPE: ICD-10-CM

## 2025-03-20 LAB — TSH SERPL DL<=0.005 MIU/L-ACNC: 0.15 UIU/ML (ref 0.3–4.2)

## 2025-03-20 RX ORDER — LEVOTHYROXINE SODIUM 50 UG/1
50 TABLET ORAL EVERY OTHER DAY
Qty: 30 TABLET | Refills: 1 | Status: SHIPPED | OUTPATIENT
Start: 2025-03-20

## 2025-03-20 RX ORDER — LEVOTHYROXINE SODIUM 75 UG/1
75 TABLET ORAL EVERY OTHER DAY
Qty: 30 TABLET | Refills: 1 | Status: SHIPPED | OUTPATIENT
Start: 2025-03-20

## 2025-05-14 ENCOUNTER — LAB (OUTPATIENT)
Dept: LAB | Facility: CLINIC | Age: 60
End: 2025-05-14
Payer: COMMERCIAL

## 2025-05-14 DIAGNOSIS — E03.9 HYPOTHYROIDISM, UNSPECIFIED TYPE: ICD-10-CM

## 2025-05-14 PROCEDURE — 36415 COLL VENOUS BLD VENIPUNCTURE: CPT

## 2025-05-14 PROCEDURE — 84443 ASSAY THYROID STIM HORMONE: CPT

## 2025-05-15 ENCOUNTER — RESULTS FOLLOW-UP (OUTPATIENT)
Dept: FAMILY MEDICINE | Facility: CLINIC | Age: 60
End: 2025-05-15

## 2025-05-15 DIAGNOSIS — E03.9 HYPOTHYROIDISM, UNSPECIFIED TYPE: ICD-10-CM

## 2025-05-15 LAB — TSH SERPL DL<=0.005 MIU/L-ACNC: 2.65 UIU/ML (ref 0.3–4.2)

## 2025-05-15 RX ORDER — LEVOTHYROXINE SODIUM 75 UG/1
75 TABLET ORAL EVERY OTHER DAY
Qty: 90 TABLET | Refills: 2 | Status: SHIPPED | OUTPATIENT
Start: 2025-05-15

## 2025-07-05 DIAGNOSIS — E03.9 HYPOTHYROIDISM, UNSPECIFIED TYPE: ICD-10-CM

## 2025-07-05 RX ORDER — LEVOTHYROXINE SODIUM 50 UG/1
50 TABLET ORAL EVERY OTHER DAY
Qty: 45 TABLET | Refills: 2 | Status: SHIPPED | OUTPATIENT
Start: 2025-07-05

## (undated) RX ORDER — LIDOCAINE HYDROCHLORIDE 10 MG/ML
INJECTION, SOLUTION EPIDURAL; INFILTRATION; INTRACAUDAL; PERINEURAL
Status: DISPENSED
Start: 2020-03-10

## (undated) RX ORDER — PROPOFOL 10 MG/ML
INJECTION, EMULSION INTRAVENOUS
Status: DISPENSED
Start: 2021-01-27

## (undated) RX ORDER — PROPOFOL 10 MG/ML
INJECTION, EMULSION INTRAVENOUS
Status: DISPENSED
Start: 2020-03-10

## (undated) RX ORDER — LIDOCAINE HYDROCHLORIDE 10 MG/ML
INJECTION, SOLUTION EPIDURAL; INFILTRATION; INTRACAUDAL; PERINEURAL
Status: DISPENSED
Start: 2021-01-27

## (undated) RX ORDER — GLYCOPYRROLATE 0.2 MG/ML
INJECTION, SOLUTION INTRAMUSCULAR; INTRAVENOUS
Status: DISPENSED
Start: 2021-01-27

## (undated) RX ORDER — GLYCOPYRROLATE 0.2 MG/ML
INJECTION, SOLUTION INTRAMUSCULAR; INTRAVENOUS
Status: DISPENSED
Start: 2020-03-10